# Patient Record
Sex: MALE | Race: WHITE | NOT HISPANIC OR LATINO | Employment: OTHER | URBAN - METROPOLITAN AREA
[De-identification: names, ages, dates, MRNs, and addresses within clinical notes are randomized per-mention and may not be internally consistent; named-entity substitution may affect disease eponyms.]

---

## 2018-11-27 ENCOUNTER — OFFICE VISIT (OUTPATIENT)
Dept: URGENT CARE | Facility: CLINIC | Age: 58
End: 2018-11-27
Payer: COMMERCIAL

## 2018-11-27 VITALS
WEIGHT: 145 LBS | SYSTOLIC BLOOD PRESSURE: 116 MMHG | OXYGEN SATURATION: 96 % | HEART RATE: 85 BPM | HEIGHT: 66 IN | BODY MASS INDEX: 23.3 KG/M2 | TEMPERATURE: 98 F | RESPIRATION RATE: 18 BRPM | DIASTOLIC BLOOD PRESSURE: 60 MMHG

## 2018-11-27 DIAGNOSIS — S72.144A CLOSED NONDISPLACED INTERTROCHANTERIC FRACTURE OF RIGHT FEMUR, INITIAL ENCOUNTER: Primary | ICD-10-CM

## 2018-11-27 PROCEDURE — 99203 OFFICE O/P NEW LOW 30 MIN: CPT | Mod: S$GLB,,, | Performed by: NURSE PRACTITIONER

## 2018-11-27 PROCEDURE — 72170 X-RAY EXAM OF PELVIS: CPT | Mod: FY,S$GLB,, | Performed by: RADIOLOGY

## 2018-11-27 NOTE — PATIENT INSTRUCTIONS
Follow-up in ER for further evaluation.   Understanding Hip Fractures  The hip is the largest weight-bearing joint in the body. Its also a common place for a fracture after a fall--especially in older people. Hip fractures are even more likely in people with osteoporosis (a disease that leads to weakened bones).    A healthy hip  The hip is a ball-and-socket joint where the femur (thighbone) joins the pelvis. When the hip is healthy, you can walk, turn, and move without pain. The head or ball of the femur (thighbone) fits into a socket in the pelvis. The ball and socket are each covered with smooth cartilage. This allows the ball to glide easily in the socket. Blood vessels supply oxygen and nutrients to keep the hip joint healthy.    A fractured hip  The hip can fracture in many places. Most often, the fracture occurs in the upper part of the femur. Rarely, you can also have more than one type of fracture at a time:  · A transcervical fracture is a break across the neck of the femur, just under the ball. This type of fracture can interrupt blood flow to the joint.  · An intertrochanteric fracture is a break down through the top of the femur.  · A subtrochanteric fracture is a break across the upper shaft of the femur.  Date Last Reviewed: 9/29/2015  © 3329-5523 [employer]. [address] [place] [address], [place] [id number]. All rights reserved. This information is not intended as a substitute for professional medical care. Always follow your healthcare professional's instructions.      Please follow up with your Primary care provider within 2-5 days if your signs and symptoms have not resolved or worsen.     If your condition worsens or fails to improve we recommend that you receive another evaluation at the emergency room immediately or contact your primary medical clinic to discuss your concerns.   You must understand that you have received an Urgent Care treatment only and that you may be released  before all of your medical problems are known or treated. You, the patient, will arrange for follow up care as instructed.     RED FLAGS/WARNING SYMPTOMS DISCUSSED WITH PATIENT THAT WOULD WARRANT EMERGENT MEDICAL ATTENTION. PATIENT VERBALIZED UNDERSTANDING.

## 2018-11-27 NOTE — PROGRESS NOTES
"Subjective:       Patient ID: Derik Fonseca is a 58 y.o. male.    Vitals:  height is 5' 6" (1.676 m) and weight is 65.8 kg (145 lb). His oral temperature is 97.9 °F (36.6 °C). His blood pressure is 116/60 and his pulse is 85. His respiration is 18 and oxygen saturation is 96%.     Chief Complaint: Hip Pain    Presented to  with c/o right hip and thigh swelling. Denies trauma; states he slept on right side. Patient is a paraplegic and was not turned. Patient also stated his hip needed to be put back in place, it feels like its out of place. Presented to today with caregiver.       Hip Pain    The incident occurred 2 days ago. The incident occurred at home. There was no injury mechanism. The pain is present in the right hip and right thigh. The pain is at a severity of 1/10. The pain is mild. The pain has been constant since onset. Associated symptoms include numbness and tingling. Pertinent negatives include no inability to bear weight, loss of motion, loss of sensation or muscle weakness. He reports no foreign bodies present. He has tried nothing for the symptoms. The treatment provided no relief.       Constitution: Negative for chills, fatigue and fever.   HENT: Negative for congestion and sore throat.    Neck: Negative for painful lymph nodes.   Cardiovascular: Negative for chest pain and leg swelling.   Eyes: Negative for double vision and blurred vision.   Respiratory: Negative for cough and shortness of breath.    Gastrointestinal: Negative for nausea, vomiting and diarrhea.   Genitourinary: Negative for dysuria, frequency and urgency.   Musculoskeletal: Positive for joint pain and joint swelling. Negative for trauma, muscle cramps and muscle ache.   Skin: Negative for color change, pale and rash.   Allergic/Immunologic: Negative for seasonal allergies.   Neurological: Positive for numbness. Negative for dizziness, history of vertigo, light-headedness, passing out and headaches.   Hematologic/Lymphatic: " Negative for swollen lymph nodes, easy bruising/bleeding and history of blood clots. Does not bruise/bleed easily.   Psychiatric/Behavioral: Negative for nervous/anxious, sleep disturbance and depression. The patient is not nervous/anxious.        Objective:      Physical Exam   Constitutional: He is oriented to person, place, and time. He appears well-developed and well-nourished. He is cooperative.  Non-toxic appearance. He does not appear ill. No distress.   HENT:   Head: Normocephalic and atraumatic.   Right Ear: Hearing, tympanic membrane, external ear and ear canal normal.   Left Ear: Hearing, tympanic membrane, external ear and ear canal normal.   Nose: Nose normal. No mucosal edema, rhinorrhea or nasal deformity. No epistaxis. Right sinus exhibits no maxillary sinus tenderness and no frontal sinus tenderness. Left sinus exhibits no maxillary sinus tenderness and no frontal sinus tenderness.   Mouth/Throat: Uvula is midline, oropharynx is clear and moist and mucous membranes are normal. No trismus in the jaw. Normal dentition. No uvula swelling. No posterior oropharyngeal erythema.   Eyes: Conjunctivae and lids are normal. Right eye exhibits no discharge. Left eye exhibits no discharge. No scleral icterus.   Sclera clear bilat   Neck: Trachea normal, normal range of motion, full passive range of motion without pain and phonation normal. Neck supple.   Cardiovascular: Normal rate, regular rhythm, normal heart sounds, intact distal pulses and normal pulses.   Pulmonary/Chest: Effort normal and breath sounds normal. No respiratory distress.   Abdominal: Soft. Normal appearance and bowel sounds are normal. He exhibits no distension, no pulsatile midline mass and no mass. There is no tenderness.   Musculoskeletal: Normal range of motion. He exhibits no edema.        Right hip: He exhibits swelling and deformity.   Leg length asymmetrical, no other abnormal deformity noted.     Neurological: He is alert and  oriented to person, place, and time. He exhibits normal muscle tone. Coordination normal.   Skin: Skin is warm, dry and intact. He is not diaphoretic. No pallor.   Psychiatric: He has a normal mood and affect. His speech is normal and behavior is normal. Judgment and thought content normal. Cognition and memory are normal.   Nursing note and vitals reviewed.      Assessment:       1. Closed nondisplaced intertrochanteric fracture of right femur, initial encounter        Plan:       Patient advised to go to ER for further evaluation and management of fracture. Risk explained. Patient verbalized understanding.  Stating he will go to  ER. Report called to triage nurse at . Expecting patients arrival.   Closed nondisplaced intertrochanteric fracture of right femur, initial encounter  -     X-Ray Pelvis Routine AP; Future; Expected date: 11/27/2018    Follow-up in ER for further evaluation.   Understanding Hip Fractures  The hip is the largest weight-bearing joint in the body. Its also a common place for a fracture after a fall--especially in older people. Hip fractures are even more likely in people with osteoporosis (a disease that leads to weakened bones).    A healthy hip  The hip is a ball-and-socket joint where the femur (thighbone) joins the pelvis. When the hip is healthy, you can walk, turn, and move without pain. The head or ball of the femur (thighbone) fits into a socket in the pelvis. The ball and socket are each covered with smooth cartilage. This allows the ball to glide easily in the socket. Blood vessels supply oxygen and nutrients to keep the hip joint healthy.    A fractured hip  The hip can fracture in many places. Most often, the fracture occurs in the upper part of the femur. Rarely, you can also have more than one type of fracture at a time:  · A transcervical fracture is a break across the neck of the femur, just under the ball. This type of fracture can interrupt blood flow to the  joint.  · An intertrochanteric fracture is a break down through the top of the femur.  · A subtrochanteric fracture is a break across the upper shaft of the femur.  Date Last Reviewed: 9/29/2015 © 2000-2017 [employer]. [address] [place] [id number]. All rights reserved. This information is not intended as a substitute for professional medical care. Always follow your healthcare professional's instructions.      Please follow up with your Primary care provider within 2-5 days if your signs and symptoms have not resolved or worsen.     If your condition worsens or fails to improve we recommend that you receive another evaluation at the emergency room immediately or contact your primary medical clinic to discuss your concerns.   You must understand that you have received an Urgent Care treatment only and that you may be released before all of your medical problems are known or treated. You, the patient, will arrange for follow up care as instructed.     RED FLAGS/WARNING SYMPTOMS DISCUSSED WITH PATIENT THAT WOULD WARRANT EMERGENT MEDICAL ATTENTION. PATIENT VERBALIZED UNDERSTANDING.

## 2019-08-23 ENCOUNTER — OFFICE VISIT (OUTPATIENT)
Dept: URGENT CARE | Facility: CLINIC | Age: 59
End: 2019-08-23
Payer: COMMERCIAL

## 2019-08-23 VITALS
HEART RATE: 81 BPM | RESPIRATION RATE: 19 BRPM | SYSTOLIC BLOOD PRESSURE: 103 MMHG | WEIGHT: 145 LBS | HEIGHT: 66 IN | OXYGEN SATURATION: 98 % | DIASTOLIC BLOOD PRESSURE: 60 MMHG | TEMPERATURE: 98 F | BODY MASS INDEX: 23.3 KG/M2

## 2019-08-23 DIAGNOSIS — R60.9 SWELLING: Primary | ICD-10-CM

## 2019-08-23 PROCEDURE — 3008F PR BODY MASS INDEX (BMI) DOCUMENTED: ICD-10-PCS | Mod: CPTII,S$GLB,, | Performed by: NURSE PRACTITIONER

## 2019-08-23 PROCEDURE — 3008F BODY MASS INDEX DOCD: CPT | Mod: CPTII,S$GLB,, | Performed by: NURSE PRACTITIONER

## 2019-08-23 PROCEDURE — 99214 OFFICE O/P EST MOD 30 MIN: CPT | Mod: S$GLB,,, | Performed by: NURSE PRACTITIONER

## 2019-08-23 PROCEDURE — 99214 PR OFFICE/OUTPT VISIT, EST, LEVL IV, 30-39 MIN: ICD-10-PCS | Mod: S$GLB,,, | Performed by: NURSE PRACTITIONER

## 2019-08-23 PROCEDURE — 73590 XR TIBIA FIBULA 2 VIEW LEFT: ICD-10-PCS | Mod: FY,LT,S$GLB, | Performed by: RADIOLOGY

## 2019-08-23 PROCEDURE — 73590 X-RAY EXAM OF LOWER LEG: CPT | Mod: FY,LT,S$GLB, | Performed by: RADIOLOGY

## 2019-08-23 NOTE — PATIENT INSTRUCTIONS
Leg Swelling in a Single Leg  Swelling of the arms, feet, ankles, and legs is called edema. It is caused by extra fluid collecting in the tissues. Because of gravity, extra fluid in the body settles to the lowest part. That is why the legs and feet are most affected. You have swelling in a single leg.  Some of the causes for swelling in only a single leg include:  · Infection in the foot or leg  · Long-term problem with a vein not working well (venous insufficiency)  · Swollen, twisted vein in the leg (varicose veins)  · Insect bite or sting on the foot or leg  · Injury or recent surgery on the foot or leg  · Blood clot in a deep vein of the leg (deep vein thrombosis or DVT)  · Inflammation of the joints of the lower leg  Medical treatment will depend on what is causing your swelling.  Home care  Follow these guidelines when caring for yourself at home:  · Dont wear tight clothing.  · Keep your legs up while lying or sitting.  · Take any medicines as directed.  · If infection, injury, or recent surgery is the cause of your swelling, stay off your legs as much as possible until your symptoms get better.  · If you have venous insufficiency or varicose veins, dont sit or  one place for long periods of time. Take breaks and walk around every few hours. Talk with your healthcare provider about wearing support stockings to help lessen swelling during the day.  · Wear compression stockings with your doctor's approval  Follow-up care  Follow up with your healthcare provider as advised.  Call 911  Call 911 if any of these occur:  · Shortness of breath or trouble breathing  · Chest pain  · Coughing up blood  · Fainting or loss of consciousness   When to seek medical advice  Call your healthcare provider right away if any of these occur:  · Increased pain, swelling, warmth, or redness of the leg, ankle, or foot  · Fever of 100.4°F (38ºC) or higher, or as directed by your healthcare provider  · Weakness or  dizziness  · Shaking chills  · Drenching sweats  Date Last Reviewed: 4/11/2016  © 3483-7015 [employer]. [address] [place] [address], [place] [address]. All rights reserved. This information is not intended as a substitute for professional medical care. Always follow your healthcare professional's instructions.

## 2019-08-23 NOTE — PROGRESS NOTES
"Subjective:       Patient ID: Derik Fonseca is a 59 y.o. male.    Vitals:  height is 5' 6" (1.676 m) and weight is 65.8 kg (145 lb). His oral temperature is 98.2 °F (36.8 °C). His blood pressure is 103/60 and his pulse is 81. His respiration is 19 and oxygen saturation is 98%.     Chief Complaint: Edema    Patient reports that he noticed some slight swelling to his knee and lower leg this morning that has improved. No trauma; Pt is a paraplegic. He denies chest pain, shortness of breath, dizziness, palpitations, or calf pain. States that this has happened previously and did not figure out a cause. Both legs are slightly swollen without erythema or warmth.  He also reports that he may have "dropped his foot this morning and would like to xray his ankle.     Edema   This is a recurrent problem. The current episode started yesterday. The problem occurs intermittently. The problem has been unchanged. Associated symptoms include joint swelling. Pertinent negatives include no arthralgias, chest pain, chills, congestion, coughing, fatigue, fever, headaches, myalgias, nausea, rash, sore throat, vertigo or vomiting. Nothing aggravates the symptoms. He has tried nothing for the symptoms.       Constitution: Negative for chills, fatigue and fever.   HENT: Negative for congestion and sore throat.    Neck: Negative for painful lymph nodes.   Cardiovascular: Negative for chest pain and leg swelling.   Eyes: Negative for double vision and blurred vision.   Respiratory: Negative for cough and shortness of breath.    Gastrointestinal: Negative for nausea, vomiting and diarrhea.   Genitourinary: Negative for dysuria, frequency and urgency.   Musculoskeletal: Positive for joint swelling. Negative for joint pain, muscle cramps and muscle ache.   Skin: Negative for color change, pale and rash.   Allergic/Immunologic: Negative for seasonal allergies.   Neurological: Negative for dizziness, history of vertigo, light-headedness, passing out " and headaches.   Hematologic/Lymphatic: Negative for swollen lymph nodes, easy bruising/bleeding and history of blood clots. Does not bruise/bleed easily.   Psychiatric/Behavioral: Negative for nervous/anxious, sleep disturbance and depression. The patient is not nervous/anxious.        Objective:      Physical Exam   Constitutional: He is oriented to person, place, and time. He appears well-developed.  Non-toxic appearance. He does not have a sickly appearance. He does not appear ill. No distress.   HENT:   Head: Normocephalic.   Right Ear: Hearing and external ear normal.   Left Ear: Hearing and external ear normal.   Nose: Nose normal.   Neck: Trachea normal and normal range of motion.   Cardiovascular: Normal rate, regular rhythm, normal heart sounds and normal pulses.   Pulses:       Dorsalis pedis pulses are 2+ on the right side, and 2+ on the left side.        Posterior tibial pulses are 2+ on the right side, and 2+ on the left side.   Pulmonary/Chest: Effort normal and breath sounds normal. No stridor. No respiratory distress. He has no wheezes. He has no rales.   Abdominal: Normal appearance.   Musculoskeletal: He exhibits edema (slight edema to lower bilateral extremities). He exhibits no deformity.        Left knee: He exhibits decreased range of motion (Pt is parapelgic). He exhibits normal patellar mobility and no bony tenderness. No tenderness found.        Right ankle: He exhibits swelling. He exhibits no deformity and normal pulse. No tenderness. No lateral malleolus and no medial malleolus tenderness found.        Left ankle: He exhibits swelling. He exhibits normal pulse. No tenderness. No lateral malleolus and no medial malleolus tenderness found. Achilles tendon exhibits no pain.        Right upper leg: Normal.        Left upper leg: Normal.        Legs:       Right foot: There is normal range of motion and no deformity.        Left foot: There is normal range of motion and no deformity.   Feet:    Right Foot:   Skin Integrity: Negative for erythema or warmth.   Left Foot:   Skin Integrity: Negative for erythema or warmth.   Lymphadenopathy:     He has no cervical adenopathy.     He has no axillary adenopathy.   Neurological: He is alert and oriented to person, place, and time.   Skin: Skin is warm and dry. Capillary refill takes less than 2 seconds.   Psychiatric: He has a normal mood and affect.   Nursing note and vitals reviewed.    X-ray Tibia Fibula 2 View Left    Result Date: 8/23/2019  EXAMINATION: XR TIBIA FIBULA 2 VIEW LEFT CLINICAL HISTORY: Edema, unspecified TECHNIQUE: AP and lateral views of the left tibia and fibula were performed. COMPARISON: None. FINDINGS: No sites of displaced fracture, periosteal reaction, cortical destruction or callus formation are identified in the included regions.  There is overall decreased density of the included osseous structures suggestive of diffuse bony demineralization. Arterial calcifications are demonstrated within the lower leg.  The included soft tissues, joint spaces and osseous structures are otherwise unremarkable radiographically.     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  Otherwise unremarkable radiographic appearance of the left tibia and fibula. Electronically signed by: Luis Cherry MD Date:    08/23/2019 Time:    12:27  Assessment:       1. Swelling        Plan:      Patient will wear RUT hose stockings.   Patient states that he will call PCP today. He does not want to seek testing at ER. States that the swelling has gotten better.   Will go to ER if develops chest pain, SOB, pain with breathing, and dizziness.   Patient Instructions     Leg Swelling in a Single Leg  Swelling of the arms, feet, ankles, and legs is called edema. It is caused by extra fluid collecting in the tissues. Because of gravity, extra fluid in the body settles to the lowest part. That is why the  legs and feet are most affected. You have swelling in a single leg.  Some of the causes for swelling in only a single leg include:  · Infection in the foot or leg  · Long-term problem with a vein not working well (venous insufficiency)  · Swollen, twisted vein in the leg (varicose veins)  · Insect bite or sting on the foot or leg  · Injury or recent surgery on the foot or leg  · Blood clot in a deep vein of the leg (deep vein thrombosis or DVT)  · Inflammation of the joints of the lower leg  Medical treatment will depend on what is causing your swelling.  Home care  Follow these guidelines when caring for yourself at home:  · Dont wear tight clothing.  · Keep your legs up while lying or sitting.  · Take any medicines as directed.  · If infection, injury, or recent surgery is the cause of your swelling, stay off your legs as much as possible until your symptoms get better.  · If you have venous insufficiency or varicose veins, dont sit or  one place for long periods of time. Take breaks and walk around every few hours. Talk with your healthcare provider about wearing support stockings to help lessen swelling during the day.  · Wear compression stockings with your doctor's approval  Follow-up care  Follow up with your healthcare provider as advised.  Call 911  Call 911 if any of these occur:  · Shortness of breath or trouble breathing  · Chest pain  · Coughing up blood  · Fainting or loss of consciousness   When to seek medical advice  Call your healthcare provider right away if any of these occur:  · Increased pain, swelling, warmth, or redness of the leg, ankle, or foot  · Fever of 100.4°F (38ºC) or higher, or as directed by your healthcare provider  · Weakness or dizziness  · Shaking chills  · Drenching sweats  Date Last Reviewed: 4/11/2016 © 2000-2017 [employer]. [address] [place] [address], [place] [address]. All rights reserved. This information is not intended as a substitute for  professional medical care. Always follow your healthcare professional's instructions.            Swelling  -     X-Ray Tibia Fibula 2 View Left; Future; Expected date: 08/23/2019

## 2019-12-13 ENCOUNTER — OFFICE VISIT (OUTPATIENT)
Dept: URGENT CARE | Facility: CLINIC | Age: 59
End: 2019-12-13
Payer: COMMERCIAL

## 2019-12-13 VITALS
HEART RATE: 99 BPM | TEMPERATURE: 99 F | SYSTOLIC BLOOD PRESSURE: 80 MMHG | HEIGHT: 66 IN | RESPIRATION RATE: 19 BRPM | DIASTOLIC BLOOD PRESSURE: 53 MMHG | WEIGHT: 145 LBS | OXYGEN SATURATION: 95 % | BODY MASS INDEX: 23.3 KG/M2

## 2019-12-13 DIAGNOSIS — S22.32XA CLOSED FRACTURE OF ONE RIB OF LEFT SIDE, INITIAL ENCOUNTER: Primary | ICD-10-CM

## 2019-12-13 DIAGNOSIS — M25.552 ACUTE HIP PAIN, LEFT: ICD-10-CM

## 2019-12-13 PROCEDURE — 73502 X-RAY EXAM HIP UNI 2-3 VIEWS: CPT | Mod: FY,LT,S$GLB, | Performed by: RADIOLOGY

## 2019-12-13 PROCEDURE — 71101 X-RAY EXAM UNILAT RIBS/CHEST: CPT | Mod: FY,LT,S$GLB, | Performed by: RADIOLOGY

## 2019-12-13 PROCEDURE — 99214 PR OFFICE/OUTPT VISIT, EST, LEVL IV, 30-39 MIN: ICD-10-PCS | Mod: S$GLB,,, | Performed by: STUDENT IN AN ORGANIZED HEALTH CARE EDUCATION/TRAINING PROGRAM

## 2019-12-13 PROCEDURE — 73502 XR HIP 2 VIEW LEFT: ICD-10-PCS | Mod: FY,LT,S$GLB, | Performed by: RADIOLOGY

## 2019-12-13 PROCEDURE — 71101 XR RIBS MIN 3 VIEWS W/ PA CHEST LEFT: ICD-10-PCS | Mod: FY,LT,S$GLB, | Performed by: RADIOLOGY

## 2019-12-13 PROCEDURE — 99214 OFFICE O/P EST MOD 30 MIN: CPT | Mod: S$GLB,,, | Performed by: STUDENT IN AN ORGANIZED HEALTH CARE EDUCATION/TRAINING PROGRAM

## 2019-12-13 RX ORDER — HYDROCODONE BITARTRATE AND ACETAMINOPHEN 5; 325 MG/1; MG/1
1 TABLET ORAL EVERY 6 HOURS PRN
Qty: 10 TABLET | Refills: 0 | Status: SHIPPED | OUTPATIENT
Start: 2019-12-13 | End: 2019-12-18

## 2019-12-13 NOTE — PATIENT INSTRUCTIONS
Rib Fracture    You broke one or more ribs. This is called a rib fracture. Rib fractures do not require a cast like other bones. They will heal by themselves in about 4-6 weeks. The first 3-4 weeks will be the most painful because deep breathing, coughing, or changing position from sitting to lying down, may cause the broken ends to move slightly.  Home care  · Rest. You should not be doing any heavy lifting or strenuous exertion until the pain goes away.  · It hurts to breathe when you have a broken rib. This puts you at risk of getting pneumonia from poor airflow through your lungs. To prevent this:  ¨ Take several very deep breaths once an hour while you're awake. Exhale through pursed lips as if you are blowing up a balloon. If possible, actually blow up a balloon or a rubber glove. This exercise builds up pressure inside the lung and prevents collapse of the small air sacs of the lung. This exercise may cause some pain at the site of injury, which is normal.  ¨ You may have gotten a breathing exercise device called an incentive spirometer. Use it at least 4 times a day, or as directed.  · Apply an ice pack over the injured area for 15 to 20 minutes every 1 to 2 hours. You should do this for the first 24 to 48 hours. You can make an ice pack by filling a plastic bag that seals at the top with ice cubes and then wrapping it with a thin towel. Continue with ice packs as needed for the relief of pain and swelling.  · You may use over-the-counter pain medicine to control pain, unless another pain medicine was prescribed. If you have chronic liver or kidney disease or ever had a stomach ulcer or GI bleeding, talk with your healthcare provider before using these medicines.  · If your pain is not controlled, contact your healthcare provider. Sometimes a stronger pain medicine may be needed. A nerve block can be done in case of severe pain. It will numb the nerve between the ribs.  Follow-up care  Follow up with your  healthcare provider, or as advised. Rarely, a broken rib will cause complications within the first few days that may not be evident during your initial exam. This can include collapsed lung, bleeding around the lung or into the abdomen, or pneumonia. Therefore, watch for the signs below.  If X-rays were taken, you will be told of any new findings that may affect your care.  Call 911  Call 911 if you have:  · Dizziness, weakness or fainting  · Shortness of breath with or without chest discomfort  · New or worsening abdominal pain  · Discomfort in other areas of your upper body such as your shoulders, jaw, neck, or arms  When to seek medical advice  Call your healthcare provider right away if any of these occur:  · Increasing chest pain with breathing  · Fever of 100.4°F (38°C) or above lasting for 24 to 48 hours  · Congested cough  Date Last Reviewed: 12/3/2015  © 3568-4545 [employer]. [address] [place] [address], [place] [address]. All rights reserved. This information is not intended as a substitute for professional medical care. Always follow your healthcare professional's instructions.

## 2019-12-13 NOTE — PROGRESS NOTES
"Subjective:       Patient ID: Derik Fonseca is a 59 y.o. male.    Vitals:  height is 5' 6" (1.676 m) and weight is 65.8 kg (145 lb). His oral temperature is 98.6 °F (37 °C). His blood pressure is 80/53 (abnormal) and his pulse is 99. His respiration is 19 and oxygen saturation is 95%.     Chief Complaint: Hip Pain    Pt presents for L rib and hip pain. Has chronic L rib pain but states it worsening and he feels like lower 2 ribs are bulging outward anteriorly more. Also complaining of L hip pain and "feel different" in posterior and lateral hip. Pt is paraplegic and wheelchair bound, hx of similar pain/presentation with fracture of R femur.    Hip Pain    The incident occurred more than 1 week ago (1-2 weeks). There was no injury mechanism. The pain is present in the left hip. The quality of the pain is described as aching. The pain is at a severity of 3/10. The pain is mild. The pain has been constant since onset. Associated symptoms include an inability to bear weight (chronic) and muscle weakness (chronic). Pertinent negatives include no loss of sensation or numbness. He reports no foreign bodies present. Nothing aggravates the symptoms. He has tried nothing for the symptoms. The treatment provided no relief.       Constitution: Negative for chills, fatigue and fever.   HENT: Negative for congestion and sore throat.    Neck: Negative for neck pain, neck stiffness and painful lymph nodes.   Cardiovascular: Negative for chest pain, leg swelling, palpitations and sob on exertion.   Eyes: Negative for eye trauma, double vision and blurred vision.   Respiratory: Negative for cough, sputum production and shortness of breath.    Gastrointestinal: Negative for nausea, vomiting and diarrhea.   Genitourinary: Negative for dysuria, frequency and urgency.   Musculoskeletal: Positive for joint pain. Negative for trauma, joint swelling, muscle cramps and muscle ache.   Skin: Negative for color change, pale and rash. " "  Allergic/Immunologic: Negative for seasonal allergies.   Neurological: Negative for dizziness, history of vertigo, light-headedness, passing out, headaches and numbness.   Hematologic/Lymphatic: Negative for swollen lymph nodes, easy bruising/bleeding and history of blood clots. Does not bruise/bleed easily.   Psychiatric/Behavioral: Negative for nervous/anxious, sleep disturbance and depression. The patient is not nervous/anxious.        Objective:       Vitals:    12/13/19 1335   BP: (!) 80/53   Pulse: 99   Resp: 19   Temp: 98.6 °F (37 °C)   TempSrc: Oral   SpO2: 95%   Weight: 65.8 kg (145 lb)   Height: 5' 6" (1.676 m)     Physical Exam   Constitutional: He is oriented to person, place, and time. He appears well-developed and well-nourished. No distress.   HENT:   Head: Normocephalic and atraumatic.   Right Ear: External ear normal.   Left Ear: External ear normal.   Nose: Nose normal.   Eyes: Conjunctivae and EOM are normal. Right eye exhibits no discharge. Left eye exhibits no discharge.   Neck: Normal range of motion. Neck supple.   Cardiovascular: Normal rate, regular rhythm and normal heart sounds.   No murmur heard.  Pulmonary/Chest: Effort normal and breath sounds normal. He has no wheezes.   Abdominal: Soft. Bowel sounds are normal. He exhibits no distension. There is no tenderness.   Musculoskeletal: He exhibits tenderness and deformity. He exhibits no edema.   Pt with b/l LE paraplegia, left hip TTP posteriorly and laterally without obvious deformity; L rib 11-12 TTP anteriorly with palpable distal end protruding, TTP over lateral lower rib region without crepitus or bruising   Neurological: He is alert and oriented to person, place, and time. No cranial nerve deficit (CN II-XII grossly intact).   Skin: Skin is warm, dry and no rash.   Psychiatric: He has a normal mood and affect. His behavior is normal. Judgment and thought content normal.   Nursing note and vitals reviewed.    XR RIB LEFT W/ PA " CHEST  Narrative: EXAMINATION:  XR RIBS MIN 3 VIEWS W/ PA CHEST LEFT    CLINICAL HISTORY:  Pleurodynia    TECHNIQUE:  Four left rib detail views.  PA view of the chest excludes the right lateral costophrenic angle and the most cephalad aspects of the right 1st and 2nd ribs.    COMPARISON:  None.    FINDINGS:  As noted above the PA view of the chest excludes the right lateral costophrenic angle on the most cephalad aspect of the right 1st and 2nd ribs.  Within the limits of the chest radiograph I detect no pulmonary disease, pleural fluid, lymph node enlargement, cardiac decompensation, pneumothorax, pneumomediastinum, pneumoperitoneum or significant osseous abnormality.  Hemostasis clips in the right upper quadrant suggest prior cholecystectomy.    There is an acute mildly displaced fracture of the axillary portion of the left 10th rib.  I detect no lytic or blastic lesion at that location or elsewhere.    There is subtle irregularity of the lateral margin of the axillary portion of the left 2nd rib which may reflect old injury.  No definite fracture detected at that location.    I detect no lytic or blastic lesion of the ribs or other bones.    Degenerative changes are present at the left glenohumeral joint, incompletely visualized; bone anchor is present in the humeral head.  The patient has right shoulder arthroplasty.  There may be old healed fracture of the left clavicle.  Impression: Acute mildly displaced fracture of the axillary portion of the left 10th rib.  No lytic or blastic lesion identified.    No intrathoracic disease identified noting that the chest radiograph is limited by incomplete inclusion of the cephalad portions of the right 1st and 2nd ribs and the lateral aspect of the right lateral costophrenic angle.    Electronically signed by: Ashwini Corbett MD  Date:    12/13/2019  Time:    14:56  XR HIP 2 VIEW LEFT  EXAMINATION:  XR HIP 2 VIEW LEFT    CLINICAL HISTORY:  Parapalegic with L hip pain  x1wk, no known injury;  Pain in left hip    FINDINGS:  There is fixation of a proximal right femur fracture.  There is heterotopic ossification at the lesser trochanter insertion site on the left.  The left hip demonstrates mild DJD.  No acute fracture dislocation bone destruction seen.    Electronically signed by: Adarsh Gibbons MD  Date:    12/13/2019  Time:    14:53        Assessment:       1. Closed fracture of one rib of left side, initial encounter    2. Acute hip pain, left        Plan:         Closed fracture of one rib of left side, initial encounter  -     XR RIB LEFT W/ PA CHEST; Future; Expected date: 12/13/2019  -     HYDROcodone-acetaminophen (NORCO) 5-325 mg per tablet; Take 1 tablet by mouth every 6 (six) hours as needed for Pain.  Dispense: 10 tablet; Refill: 0       -  reviewed and risks of opiate medications discussed; educated on increased risk of respiratory depression with benzodiazepine use; patient expressed understanding  - pt has IS at home, counseled to use q30min while awake    Acute hip pain, left  -     XR HIP 2 VIEW LEFT; Future; Expected date: 12/13/2019    Results, medications and diagnosis reviewed with patient, questions answered, and return precautions given    Follow up in 4 days (on 12/17/2019) for re-evaluation with PCP as scheduled, or sooner if worsening.    Ricky Aparicio MD/MPH  [facility]  [facility]

## 2020-12-29 ENCOUNTER — CLINICAL SUPPORT (OUTPATIENT)
Dept: URGENT CARE | Facility: CLINIC | Age: 60
End: 2020-12-29
Payer: COMMERCIAL

## 2020-12-29 DIAGNOSIS — U07.1 COVID-19 VIRUS INFECTION: Primary | ICD-10-CM

## 2020-12-29 LAB
CTP QC/QA: YES
SARS-COV-2 RDRP RESP QL NAA+PROBE: POSITIVE

## 2020-12-29 PROCEDURE — U0002: ICD-10-PCS | Mod: QW,S$GLB,, | Performed by: PHYSICIAN ASSISTANT

## 2020-12-29 PROCEDURE — U0002 COVID-19 LAB TEST NON-CDC: HCPCS | Mod: QW,S$GLB,, | Performed by: PHYSICIAN ASSISTANT

## 2020-12-31 ENCOUNTER — PATIENT MESSAGE (OUTPATIENT)
Dept: ADMINISTRATIVE | Facility: OTHER | Age: 60
End: 2020-12-31

## 2020-12-31 ENCOUNTER — TELEPHONE (OUTPATIENT)
Dept: ADMINISTRATIVE | Facility: CLINIC | Age: 60
End: 2020-12-31

## 2020-12-31 ENCOUNTER — INFUSION (OUTPATIENT)
Dept: INFECTIOUS DISEASES | Facility: HOSPITAL | Age: 60
End: 2020-12-31
Attending: STUDENT IN AN ORGANIZED HEALTH CARE EDUCATION/TRAINING PROGRAM
Payer: COMMERCIAL

## 2020-12-31 VITALS
SYSTOLIC BLOOD PRESSURE: 97 MMHG | RESPIRATION RATE: 18 BRPM | DIASTOLIC BLOOD PRESSURE: 55 MMHG | TEMPERATURE: 99 F | OXYGEN SATURATION: 97 % | WEIGHT: 165 LBS | HEIGHT: 70 IN | HEART RATE: 52 BPM | BODY MASS INDEX: 23.62 KG/M2

## 2020-12-31 DIAGNOSIS — U07.1 COVID-19: Primary | ICD-10-CM

## 2020-12-31 DIAGNOSIS — U07.1 COVID-19 VIRUS INFECTION: ICD-10-CM

## 2020-12-31 PROCEDURE — 25000003 PHARM REV CODE 250: Performed by: INTERNAL MEDICINE

## 2020-12-31 PROCEDURE — 63600175 PHARM REV CODE 636 W HCPCS: Performed by: INTERNAL MEDICINE

## 2020-12-31 PROCEDURE — M0239 BAMLANIVIMAB-XXXX INFUSION: HCPCS | Performed by: INTERNAL MEDICINE

## 2020-12-31 RX ORDER — SODIUM CHLORIDE 0.9 % (FLUSH) 0.9 %
10 SYRINGE (ML) INJECTION
Status: ACTIVE | OUTPATIENT
Start: 2020-12-31

## 2020-12-31 RX ORDER — EPINEPHRINE 0.1 MG/ML
0.3 INJECTION INTRAVENOUS
Status: ACTIVE | OUTPATIENT
Start: 2020-12-31

## 2020-12-31 RX ORDER — DIPHENHYDRAMINE HYDROCHLORIDE 50 MG/ML
25 INJECTION INTRAMUSCULAR; INTRAVENOUS ONCE AS NEEDED
Status: ACTIVE | OUTPATIENT
Start: 2020-12-31 | End: 2032-05-29

## 2020-12-31 RX ORDER — ALBUTEROL SULFATE 90 UG/1
2 AEROSOL, METERED RESPIRATORY (INHALATION)
Status: ACTIVE | OUTPATIENT
Start: 2020-12-31

## 2020-12-31 RX ORDER — ONDANSETRON 4 MG/1
4 TABLET, ORALLY DISINTEGRATING ORAL ONCE AS NEEDED
Status: ACTIVE | OUTPATIENT
Start: 2020-12-31 | End: 2032-05-29

## 2020-12-31 RX ORDER — ACETAMINOPHEN 325 MG/1
650 TABLET ORAL ONCE AS NEEDED
Status: ACTIVE | OUTPATIENT
Start: 2020-12-31 | End: 2032-05-29

## 2020-12-31 RX ADMIN — SODIUM CHLORIDE 700 MG: 0.9 INJECTION, SOLUTION INTRAVENOUS at 10:12

## 2020-12-31 NOTE — PROGRESS NOTES
Patient arrives for bamlanivimab infusion 1020    Symptoms -  NO S/S TESTED IN CASE DX 12/29      Patient received infusion according to FDA recommendations and [facility] SOP without complications noted and left with mask in place and drug fact sheet provided

## 2021-01-01 ENCOUNTER — NURSE TRIAGE (OUTPATIENT)
Dept: ADMINISTRATIVE | Facility: CLINIC | Age: 61
End: 2021-01-01

## 2021-01-01 ENCOUNTER — PATIENT MESSAGE (OUTPATIENT)
Dept: ADMINISTRATIVE | Facility: OTHER | Age: 61
End: 2021-01-01

## 2021-01-02 ENCOUNTER — TELEPHONE (OUTPATIENT)
Dept: ADMINISTRATIVE | Facility: CLINIC | Age: 61
End: 2021-01-02

## 2021-01-02 ENCOUNTER — PATIENT MESSAGE (OUTPATIENT)
Dept: ADMINISTRATIVE | Facility: OTHER | Age: 61
End: 2021-01-02

## 2021-01-03 ENCOUNTER — TELEPHONE (OUTPATIENT)
Dept: ADMINISTRATIVE | Facility: CLINIC | Age: 61
End: 2021-01-03

## 2021-01-03 ENCOUNTER — PATIENT MESSAGE (OUTPATIENT)
Dept: ADMINISTRATIVE | Facility: OTHER | Age: 61
End: 2021-01-03

## 2021-01-04 ENCOUNTER — PATIENT MESSAGE (OUTPATIENT)
Dept: ADMINISTRATIVE | Facility: CLINIC | Age: 61
End: 2021-01-04

## 2021-01-04 ENCOUNTER — PATIENT MESSAGE (OUTPATIENT)
Dept: ADMINISTRATIVE | Facility: OTHER | Age: 61
End: 2021-01-04

## 2021-01-04 ENCOUNTER — TELEPHONE (OUTPATIENT)
Dept: ADMINISTRATIVE | Facility: OTHER | Age: 61
End: 2021-01-04

## 2021-01-05 ENCOUNTER — PATIENT MESSAGE (OUTPATIENT)
Dept: ADMINISTRATIVE | Facility: OTHER | Age: 61
End: 2021-01-05

## 2021-01-05 ENCOUNTER — NURSE TRIAGE (OUTPATIENT)
Dept: ADMINISTRATIVE | Facility: CLINIC | Age: 61
End: 2021-01-05

## 2021-01-06 ENCOUNTER — PATIENT MESSAGE (OUTPATIENT)
Dept: ADMINISTRATIVE | Facility: CLINIC | Age: 61
End: 2021-01-06

## 2021-01-06 ENCOUNTER — PATIENT MESSAGE (OUTPATIENT)
Dept: ADMINISTRATIVE | Facility: OTHER | Age: 61
End: 2021-01-06

## 2021-01-07 ENCOUNTER — PATIENT MESSAGE (OUTPATIENT)
Dept: ADMINISTRATIVE | Facility: CLINIC | Age: 61
End: 2021-01-07

## 2021-01-07 ENCOUNTER — PATIENT MESSAGE (OUTPATIENT)
Dept: ADMINISTRATIVE | Facility: OTHER | Age: 61
End: 2021-01-07

## 2021-01-07 ENCOUNTER — NURSE TRIAGE (OUTPATIENT)
Dept: ADMINISTRATIVE | Facility: CLINIC | Age: 61
End: 2021-01-07

## 2021-01-08 ENCOUNTER — PATIENT MESSAGE (OUTPATIENT)
Dept: ADMINISTRATIVE | Facility: CLINIC | Age: 61
End: 2021-01-08

## 2021-01-09 ENCOUNTER — NURSE TRIAGE (OUTPATIENT)
Dept: ADMINISTRATIVE | Facility: CLINIC | Age: 61
End: 2021-01-09

## 2021-04-16 ENCOUNTER — PATIENT MESSAGE (OUTPATIENT)
Dept: RESEARCH | Facility: HOSPITAL | Age: 61
End: 2021-04-16

## 2021-05-31 ENCOUNTER — TELEPHONE (OUTPATIENT)
Dept: DERMATOLOGY | Facility: CLINIC | Age: 61
End: 2021-05-31

## 2021-05-31 ENCOUNTER — OFFICE VISIT (OUTPATIENT)
Dept: URGENT CARE | Facility: CLINIC | Age: 61
End: 2021-05-31
Payer: MEDICARE

## 2021-05-31 VITALS
DIASTOLIC BLOOD PRESSURE: 72 MMHG | BODY MASS INDEX: 23.62 KG/M2 | WEIGHT: 165 LBS | HEIGHT: 70 IN | RESPIRATION RATE: 16 BRPM | SYSTOLIC BLOOD PRESSURE: 93 MMHG | TEMPERATURE: 99 F | HEART RATE: 71 BPM | OXYGEN SATURATION: 98 %

## 2021-05-31 DIAGNOSIS — L02.811 ABSCESS OF SCALP: Primary | ICD-10-CM

## 2021-05-31 PROCEDURE — 99214 PR OFFICE/OUTPT VISIT, EST, LEVL IV, 30-39 MIN: ICD-10-PCS | Mod: S$GLB,,, | Performed by: NURSE PRACTITIONER

## 2021-05-31 PROCEDURE — 99214 OFFICE O/P EST MOD 30 MIN: CPT | Mod: S$GLB,,, | Performed by: NURSE PRACTITIONER

## 2021-05-31 RX ORDER — DOXYCYCLINE 100 MG/1
100 CAPSULE ORAL EVERY 12 HOURS
Qty: 14 CAPSULE | Refills: 0 | Status: SHIPPED | OUTPATIENT
Start: 2021-05-31 | End: 2021-06-07

## 2021-06-04 ENCOUNTER — HOSPITAL ENCOUNTER (OUTPATIENT)
Dept: WOUND CARE | Facility: HOSPITAL | Age: 61
Discharge: HOME | End: 2021-06-04
Attending: PREVENTIVE MEDICINE
Payer: MEDICARE

## 2021-06-04 VITALS
WEIGHT: 165 LBS | SYSTOLIC BLOOD PRESSURE: 146 MMHG | BODY MASS INDEX: 23.62 KG/M2 | TEMPERATURE: 98 F | HEART RATE: 86 BPM | HEIGHT: 70 IN | DIASTOLIC BLOOD PRESSURE: 92 MMHG

## 2021-06-04 DIAGNOSIS — L02.811 ABSCESS OF HEAD: ICD-10-CM

## 2021-06-04 DIAGNOSIS — L89.323 STAGE III PRESSURE ULCER OF LEFT BUTTOCK: Primary | ICD-10-CM

## 2021-06-04 DIAGNOSIS — G82.20 PARAPLEGIA: ICD-10-CM

## 2021-06-04 DIAGNOSIS — Z98.890 S/P FLAP GRAFT: ICD-10-CM

## 2021-06-04 DIAGNOSIS — L40.9 PSORIASIS: ICD-10-CM

## 2021-06-04 PROCEDURE — 87186 SC STD MICRODIL/AGAR DIL: CPT | Performed by: PREVENTIVE MEDICINE

## 2021-06-04 PROCEDURE — 87075 CULTR BACTERIA EXCEPT BLOOD: CPT | Performed by: PREVENTIVE MEDICINE

## 2021-06-04 PROCEDURE — 11721 DEBRIDE NAIL 6 OR MORE: CPT

## 2021-06-04 PROCEDURE — 87070 CULTURE OTHR SPECIMN AEROBIC: CPT | Performed by: PREVENTIVE MEDICINE

## 2021-06-04 PROCEDURE — 10060 I&D ABSCESS SIMPLE/SINGLE: CPT

## 2021-06-04 PROCEDURE — 87077 CULTURE AEROBIC IDENTIFY: CPT | Performed by: PREVENTIVE MEDICINE

## 2021-06-07 ENCOUNTER — HOSPITAL ENCOUNTER (OUTPATIENT)
Dept: WOUND CARE | Facility: HOSPITAL | Age: 61
Discharge: HOME | End: 2021-06-07
Attending: PREVENTIVE MEDICINE
Payer: MEDICARE

## 2021-06-07 DIAGNOSIS — L02.811 ABSCESS OF HEAD: ICD-10-CM

## 2021-06-07 DIAGNOSIS — G82.20 PARAPLEGIA: ICD-10-CM

## 2021-06-07 DIAGNOSIS — L89.323 STAGE III PRESSURE ULCER OF LEFT BUTTOCK: Primary | ICD-10-CM

## 2021-06-07 DIAGNOSIS — Z98.890 S/P FLAP GRAFT: ICD-10-CM

## 2021-06-07 LAB — BACTERIA SPEC AEROBE CULT: ABNORMAL

## 2021-06-07 PROCEDURE — 99213 OFFICE O/P EST LOW 20 MIN: CPT

## 2021-06-08 LAB — BACTERIA SPEC ANAEROBE CULT: NORMAL

## 2021-06-18 ENCOUNTER — HOSPITAL ENCOUNTER (OUTPATIENT)
Dept: WOUND CARE | Facility: HOSPITAL | Age: 61
Discharge: HOME | End: 2021-06-18
Attending: PREVENTIVE MEDICINE
Payer: MEDICARE

## 2021-06-18 VITALS
SYSTOLIC BLOOD PRESSURE: 91 MMHG | TEMPERATURE: 97 F | BODY MASS INDEX: 23.62 KG/M2 | DIASTOLIC BLOOD PRESSURE: 54 MMHG | HEIGHT: 70 IN | HEART RATE: 67 BPM | WEIGHT: 165 LBS

## 2021-06-18 DIAGNOSIS — G82.20 PARAPLEGIA: ICD-10-CM

## 2021-06-18 DIAGNOSIS — S40.811A ARM ABRASION, RIGHT, INITIAL ENCOUNTER: ICD-10-CM

## 2021-06-18 DIAGNOSIS — Z98.890 S/P FLAP GRAFT: ICD-10-CM

## 2021-06-18 DIAGNOSIS — L02.811 ABSCESS OF HEAD: ICD-10-CM

## 2021-06-18 DIAGNOSIS — L89.323 STAGE III PRESSURE ULCER OF LEFT BUTTOCK: Primary | ICD-10-CM

## 2021-06-18 PROCEDURE — 99213 OFFICE O/P EST LOW 20 MIN: CPT

## 2021-06-18 RX ORDER — MUPIROCIN 20 MG/G
OINTMENT TOPICAL DAILY
Qty: 22 G | Refills: 2 | Status: SHIPPED | OUTPATIENT
Start: 2021-06-18 | End: 2024-03-07

## 2021-06-26 ENCOUNTER — OFFICE VISIT (OUTPATIENT)
Dept: URGENT CARE | Facility: CLINIC | Age: 61
End: 2021-06-26
Payer: MEDICARE

## 2021-06-26 VITALS
SYSTOLIC BLOOD PRESSURE: 101 MMHG | HEART RATE: 66 BPM | BODY MASS INDEX: 23.62 KG/M2 | DIASTOLIC BLOOD PRESSURE: 69 MMHG | HEIGHT: 70 IN | TEMPERATURE: 98 F | OXYGEN SATURATION: 96 % | RESPIRATION RATE: 16 BRPM | WEIGHT: 165 LBS

## 2021-06-26 DIAGNOSIS — L02.811 SCALP ABSCESS: Primary | ICD-10-CM

## 2021-06-26 PROCEDURE — 99215 PR OFFICE/OUTPT VISIT, EST, LEVL V, 40-54 MIN: ICD-10-PCS | Mod: 25,S$GLB,, | Performed by: PHYSICIAN ASSISTANT

## 2021-06-26 PROCEDURE — 99215 OFFICE O/P EST HI 40 MIN: CPT | Mod: 25,S$GLB,, | Performed by: PHYSICIAN ASSISTANT

## 2021-06-26 PROCEDURE — 10060 I&D ABSCESS SIMPLE/SINGLE: CPT | Mod: S$GLB,,, | Performed by: PHYSICIAN ASSISTANT

## 2021-06-26 PROCEDURE — 10060 INCISION & DRAINAGE: ICD-10-PCS | Mod: S$GLB,,, | Performed by: PHYSICIAN ASSISTANT

## 2021-06-26 RX ORDER — DOXYCYCLINE 100 MG/1
100 CAPSULE ORAL 2 TIMES DAILY
Qty: 20 CAPSULE | Refills: 0 | Status: SHIPPED | OUTPATIENT
Start: 2021-06-26 | End: 2021-07-06

## 2021-06-26 RX ORDER — SULFAMETHOXAZOLE AND TRIMETHOPRIM 800; 160 MG/1; MG/1
1 TABLET ORAL 2 TIMES DAILY
Qty: 20 TABLET | Refills: 0 | Status: SHIPPED | OUTPATIENT
Start: 2021-06-26 | End: 2021-06-26

## 2021-06-26 RX ORDER — MUPIROCIN 20 MG/G
OINTMENT TOPICAL 3 TIMES DAILY
Qty: 2 G | Refills: 0 | Status: SHIPPED | OUTPATIENT
Start: 2021-06-26 | End: 2021-06-26

## 2021-06-26 RX ORDER — SULFAMETHOXAZOLE AND TRIMETHOPRIM 800; 160 MG/1; MG/1
1 TABLET ORAL 2 TIMES DAILY
Qty: 20 TABLET | Refills: 0 | Status: SHIPPED | OUTPATIENT
Start: 2021-06-26 | End: 2021-06-28 | Stop reason: SDUPTHER

## 2021-06-26 RX ORDER — MUPIROCIN 20 MG/G
OINTMENT TOPICAL 3 TIMES DAILY
Qty: 2 G | Refills: 0 | Status: SHIPPED | OUTPATIENT
Start: 2021-06-26 | End: 2024-03-07

## 2021-06-26 RX ORDER — DOXYCYCLINE 100 MG/1
100 CAPSULE ORAL 2 TIMES DAILY
Qty: 20 CAPSULE | Refills: 0 | Status: SHIPPED | OUTPATIENT
Start: 2021-06-26 | End: 2021-06-26

## 2021-06-28 ENCOUNTER — HOSPITAL ENCOUNTER (OUTPATIENT)
Dept: WOUND CARE | Facility: HOSPITAL | Age: 61
Discharge: HOME | End: 2021-06-28
Attending: PREVENTIVE MEDICINE
Payer: MEDICARE

## 2021-06-28 VITALS
WEIGHT: 165 LBS | DIASTOLIC BLOOD PRESSURE: 85 MMHG | HEIGHT: 70 IN | SYSTOLIC BLOOD PRESSURE: 162 MMHG | HEART RATE: 73 BPM | BODY MASS INDEX: 23.62 KG/M2 | TEMPERATURE: 97 F

## 2021-06-28 DIAGNOSIS — S40.811A ARM ABRASION, RIGHT, INITIAL ENCOUNTER: ICD-10-CM

## 2021-06-28 DIAGNOSIS — G82.20 PARAPLEGIA: ICD-10-CM

## 2021-06-28 DIAGNOSIS — L02.811 SCALP ABSCESS: ICD-10-CM

## 2021-06-28 DIAGNOSIS — L02.811 ABSCESS OF HEAD: ICD-10-CM

## 2021-06-28 DIAGNOSIS — L89.323 STAGE III PRESSURE ULCER OF LEFT BUTTOCK: ICD-10-CM

## 2021-06-28 DIAGNOSIS — Z98.890 S/P FLAP GRAFT: Primary | ICD-10-CM

## 2021-06-28 PROCEDURE — 99204 OFFICE O/P NEW MOD 45 MIN: CPT

## 2021-06-28 RX ORDER — DALBAVANCIN 500 MG/25ML
1500 INJECTION, POWDER, FOR SOLUTION INTRAVENOUS ONCE
Qty: 75 ML | Refills: 0 | OUTPATIENT
Start: 2021-06-28 | End: 2021-06-28

## 2021-06-28 RX ORDER — SULFAMETHOXAZOLE AND TRIMETHOPRIM 800; 160 MG/1; MG/1
1 TABLET ORAL 2 TIMES DAILY
Qty: 20 TABLET | Refills: 0 | Status: SHIPPED | OUTPATIENT
Start: 2021-06-28 | End: 2021-07-08

## 2021-07-09 ENCOUNTER — HOSPITAL ENCOUNTER (OUTPATIENT)
Dept: WOUND CARE | Facility: HOSPITAL | Age: 61
Discharge: HOME | End: 2021-07-09
Attending: PREVENTIVE MEDICINE
Payer: MEDICARE

## 2021-07-09 VITALS
WEIGHT: 165 LBS | SYSTOLIC BLOOD PRESSURE: 164 MMHG | HEIGHT: 70 IN | TEMPERATURE: 97 F | DIASTOLIC BLOOD PRESSURE: 88 MMHG | BODY MASS INDEX: 23.62 KG/M2 | HEART RATE: 77 BPM

## 2021-07-09 DIAGNOSIS — G82.20 PARAPLEGIA: ICD-10-CM

## 2021-07-09 DIAGNOSIS — L02.811 ABSCESS OF HEAD: ICD-10-CM

## 2021-07-09 DIAGNOSIS — Z98.890 S/P FLAP GRAFT: ICD-10-CM

## 2021-07-09 DIAGNOSIS — L89.323 STAGE III PRESSURE ULCER OF LEFT BUTTOCK: Primary | ICD-10-CM

## 2021-07-09 PROCEDURE — 11042 DBRDMT SUBQ TIS 1ST 20SQCM/<: CPT

## 2021-07-09 PROCEDURE — 27201912 HC WOUND CARE DEBRIDEMENT SUPPLIES

## 2021-07-23 ENCOUNTER — HOSPITAL ENCOUNTER (OUTPATIENT)
Dept: WOUND CARE | Facility: HOSPITAL | Age: 61
Discharge: HOME | End: 2021-07-23
Attending: PREVENTIVE MEDICINE
Payer: MEDICARE

## 2021-07-23 VITALS
SYSTOLIC BLOOD PRESSURE: 105 MMHG | TEMPERATURE: 98 F | DIASTOLIC BLOOD PRESSURE: 56 MMHG | HEART RATE: 77 BPM | WEIGHT: 165 LBS | HEIGHT: 70 IN | BODY MASS INDEX: 23.62 KG/M2

## 2021-07-23 DIAGNOSIS — L89.323 STAGE III PRESSURE ULCER OF LEFT BUTTOCK: Primary | ICD-10-CM

## 2021-07-23 DIAGNOSIS — Z98.890 S/P FLAP GRAFT: ICD-10-CM

## 2021-07-23 DIAGNOSIS — G82.20 PARAPLEGIA: ICD-10-CM

## 2021-07-23 PROCEDURE — 99212 OFFICE O/P EST SF 10 MIN: CPT

## 2021-08-06 ENCOUNTER — HOSPITAL ENCOUNTER (OUTPATIENT)
Dept: WOUND CARE | Facility: HOSPITAL | Age: 61
Discharge: HOME | End: 2021-08-06
Attending: PREVENTIVE MEDICINE
Payer: MEDICARE

## 2021-08-06 VITALS
TEMPERATURE: 97 F | DIASTOLIC BLOOD PRESSURE: 79 MMHG | BODY MASS INDEX: 23.62 KG/M2 | HEART RATE: 75 BPM | HEIGHT: 70 IN | WEIGHT: 165 LBS | SYSTOLIC BLOOD PRESSURE: 145 MMHG

## 2021-08-06 DIAGNOSIS — Z98.890 S/P FLAP GRAFT: ICD-10-CM

## 2021-08-06 DIAGNOSIS — G82.20 PARAPLEGIA: ICD-10-CM

## 2021-08-06 DIAGNOSIS — L89.323 STAGE III PRESSURE ULCER OF LEFT BUTTOCK: Primary | ICD-10-CM

## 2021-08-06 DIAGNOSIS — L89.312: ICD-10-CM

## 2021-08-06 PROCEDURE — 99214 OFFICE O/P EST MOD 30 MIN: CPT | Mod: 27

## 2021-08-11 DIAGNOSIS — L89.323 STAGE III PRESSURE ULCER OF LEFT BUTTOCK: Primary | ICD-10-CM

## 2021-08-20 ENCOUNTER — HOSPITAL ENCOUNTER (OUTPATIENT)
Dept: WOUND CARE | Facility: HOSPITAL | Age: 61
Discharge: HOME | End: 2021-08-20
Attending: PREVENTIVE MEDICINE
Payer: MEDICARE

## 2021-08-20 VITALS
SYSTOLIC BLOOD PRESSURE: 132 MMHG | WEIGHT: 165 LBS | HEART RATE: 77 BPM | TEMPERATURE: 98 F | HEIGHT: 70 IN | BODY MASS INDEX: 23.62 KG/M2 | DIASTOLIC BLOOD PRESSURE: 73 MMHG

## 2021-08-20 DIAGNOSIS — L89.323 STAGE III PRESSURE ULCER OF LEFT BUTTOCK: Primary | ICD-10-CM

## 2021-08-20 DIAGNOSIS — G82.20 PARAPLEGIA: ICD-10-CM

## 2021-08-20 PROCEDURE — 99213 OFFICE O/P EST LOW 20 MIN: CPT

## 2021-08-22 ENCOUNTER — TELEPHONE (OUTPATIENT)
Dept: EMERGENCY MEDICINE | Facility: HOSPITAL | Age: 61
End: 2021-08-22

## 2021-08-22 DIAGNOSIS — U07.1 COVID-19 VIRUS INFECTION: Primary | ICD-10-CM

## 2021-08-23 ENCOUNTER — CLINICAL SUPPORT (OUTPATIENT)
Dept: URGENT CARE | Facility: CLINIC | Age: 61
End: 2021-08-23
Payer: MEDICARE

## 2021-08-23 DIAGNOSIS — Z11.59 ENCOUNTER FOR SCREENING FOR OTHER VIRAL DISEASES: Primary | ICD-10-CM

## 2021-08-23 LAB
CTP QC/QA: YES
SARS-COV-2 RDRP RESP QL NAA+PROBE: NEGATIVE

## 2021-08-23 PROCEDURE — U0002 COVID-19 LAB TEST NON-CDC: HCPCS | Mod: QW,CR,S$GLB, | Performed by: NURSE PRACTITIONER

## 2021-08-23 PROCEDURE — 99211 PR OFFICE/OUTPT VISIT, EST, LEVL I: ICD-10-PCS | Mod: S$GLB,,, | Performed by: NURSE PRACTITIONER

## 2021-08-23 PROCEDURE — 99211 OFF/OP EST MAY X REQ PHY/QHP: CPT | Mod: S$GLB,,, | Performed by: NURSE PRACTITIONER

## 2021-08-23 PROCEDURE — U0002: ICD-10-PCS | Mod: QW,CR,S$GLB, | Performed by: NURSE PRACTITIONER

## 2021-09-10 ENCOUNTER — HOSPITAL ENCOUNTER (OUTPATIENT)
Dept: WOUND CARE | Facility: HOSPITAL | Age: 61
Discharge: HOME | End: 2021-09-10
Attending: PREVENTIVE MEDICINE
Payer: MEDICARE

## 2021-09-10 VITALS
WEIGHT: 165 LBS | DIASTOLIC BLOOD PRESSURE: 72 MMHG | HEART RATE: 82 BPM | BODY MASS INDEX: 23.62 KG/M2 | SYSTOLIC BLOOD PRESSURE: 120 MMHG | HEIGHT: 70 IN | TEMPERATURE: 97 F

## 2021-09-10 DIAGNOSIS — L89.323 STAGE III PRESSURE ULCER OF LEFT BUTTOCK: Primary | ICD-10-CM

## 2021-09-10 DIAGNOSIS — Z98.890 S/P FLAP GRAFT: ICD-10-CM

## 2021-09-10 DIAGNOSIS — G82.20 PARAPLEGIA: ICD-10-CM

## 2021-09-10 PROCEDURE — 99213 OFFICE O/P EST LOW 20 MIN: CPT

## 2021-10-01 ENCOUNTER — HOSPITAL ENCOUNTER (OUTPATIENT)
Dept: WOUND CARE | Facility: HOSPITAL | Age: 61
Discharge: HOME | End: 2021-10-01
Attending: PREVENTIVE MEDICINE
Payer: MEDICARE

## 2021-10-01 VITALS — SYSTOLIC BLOOD PRESSURE: 100 MMHG | DIASTOLIC BLOOD PRESSURE: 57 MMHG | TEMPERATURE: 98 F | HEART RATE: 78 BPM

## 2021-10-01 DIAGNOSIS — G82.20 PARAPLEGIA: ICD-10-CM

## 2021-10-01 DIAGNOSIS — L02.811 SCALP ABSCESS: ICD-10-CM

## 2021-10-01 DIAGNOSIS — Z98.890 S/P FLAP GRAFT: ICD-10-CM

## 2021-10-01 DIAGNOSIS — L89.323 STAGE III PRESSURE ULCER OF LEFT BUTTOCK: Primary | ICD-10-CM

## 2021-10-01 PROCEDURE — 17250 CHEM CAUT OF GRANLTJ TISSUE: CPT

## 2021-10-01 RX ORDER — SULFAMETHOXAZOLE AND TRIMETHOPRIM 800; 160 MG/1; MG/1
1 TABLET ORAL 2 TIMES DAILY
Qty: 20 TABLET | Refills: 0 | Status: SHIPPED | OUTPATIENT
Start: 2021-10-01 | End: 2021-10-11

## 2021-10-15 ENCOUNTER — HOSPITAL ENCOUNTER (OUTPATIENT)
Dept: WOUND CARE | Facility: HOSPITAL | Age: 61
Discharge: HOME | End: 2021-10-15
Attending: PREVENTIVE MEDICINE
Payer: MEDICARE

## 2021-10-15 VITALS
BODY MASS INDEX: 23.62 KG/M2 | SYSTOLIC BLOOD PRESSURE: 122 MMHG | HEART RATE: 79 BPM | DIASTOLIC BLOOD PRESSURE: 76 MMHG | TEMPERATURE: 98 F | HEIGHT: 70 IN | WEIGHT: 165 LBS

## 2021-10-15 DIAGNOSIS — G82.20 PARAPLEGIA: ICD-10-CM

## 2021-10-15 DIAGNOSIS — L89.323 STAGE III PRESSURE ULCER OF LEFT BUTTOCK: Primary | ICD-10-CM

## 2021-10-15 DIAGNOSIS — Z98.890 S/P FLAP GRAFT: ICD-10-CM

## 2021-10-15 PROCEDURE — 99214 OFFICE O/P EST MOD 30 MIN: CPT

## 2021-11-05 ENCOUNTER — HOSPITAL ENCOUNTER (OUTPATIENT)
Dept: WOUND CARE | Facility: HOSPITAL | Age: 61
Discharge: HOME | End: 2021-11-05
Attending: PREVENTIVE MEDICINE
Payer: MEDICARE

## 2021-11-05 VITALS
TEMPERATURE: 97 F | SYSTOLIC BLOOD PRESSURE: 181 MMHG | DIASTOLIC BLOOD PRESSURE: 95 MMHG | HEIGHT: 70 IN | BODY MASS INDEX: 23.62 KG/M2 | HEART RATE: 77 BPM | WEIGHT: 165 LBS

## 2021-11-05 DIAGNOSIS — Z98.890 S/P FLAP GRAFT: ICD-10-CM

## 2021-11-05 DIAGNOSIS — G82.20 PARAPLEGIA: ICD-10-CM

## 2021-11-05 DIAGNOSIS — L89.323 STAGE III PRESSURE ULCER OF LEFT BUTTOCK: Primary | ICD-10-CM

## 2021-11-05 PROCEDURE — 27201912 HC WOUND CARE DEBRIDEMENT SUPPLIES

## 2021-11-05 PROCEDURE — 11042 DBRDMT SUBQ TIS 1ST 20SQCM/<: CPT

## 2021-11-15 RX ORDER — DOXYCYCLINE HYCLATE 100 MG
100 TABLET ORAL EVERY 12 HOURS
Qty: 20 TABLET | Refills: 0 | Status: SHIPPED | OUTPATIENT
Start: 2021-11-15 | End: 2021-11-25

## 2021-11-19 ENCOUNTER — HOSPITAL ENCOUNTER (OUTPATIENT)
Dept: WOUND CARE | Facility: HOSPITAL | Age: 61
Discharge: HOME | End: 2021-11-19
Attending: PREVENTIVE MEDICINE
Payer: MEDICARE

## 2021-11-19 VITALS
WEIGHT: 165 LBS | SYSTOLIC BLOOD PRESSURE: 187 MMHG | BODY MASS INDEX: 23.62 KG/M2 | DIASTOLIC BLOOD PRESSURE: 101 MMHG | TEMPERATURE: 99 F | HEART RATE: 111 BPM | HEIGHT: 70 IN

## 2021-11-19 DIAGNOSIS — L89.323 STAGE III PRESSURE ULCER OF LEFT BUTTOCK: Primary | ICD-10-CM

## 2021-11-19 DIAGNOSIS — Z98.890 S/P FLAP GRAFT: ICD-10-CM

## 2021-11-19 DIAGNOSIS — G82.20 PARAPLEGIA: ICD-10-CM

## 2021-11-19 PROCEDURE — 11042 DBRDMT SUBQ TIS 1ST 20SQCM/<: CPT

## 2021-11-19 PROCEDURE — 27201912 HC WOUND CARE DEBRIDEMENT SUPPLIES

## 2021-12-03 ENCOUNTER — HOSPITAL ENCOUNTER (OUTPATIENT)
Dept: WOUND CARE | Facility: HOSPITAL | Age: 61
Discharge: HOME | End: 2021-12-03
Attending: PREVENTIVE MEDICINE
Payer: MEDICARE

## 2021-12-03 VITALS
HEART RATE: 80 BPM | WEIGHT: 165 LBS | BODY MASS INDEX: 23.62 KG/M2 | DIASTOLIC BLOOD PRESSURE: 86 MMHG | SYSTOLIC BLOOD PRESSURE: 179 MMHG | HEIGHT: 70 IN

## 2021-12-03 DIAGNOSIS — L89.323 STAGE III PRESSURE ULCER OF LEFT BUTTOCK: Primary | ICD-10-CM

## 2021-12-03 DIAGNOSIS — L89.312: ICD-10-CM

## 2021-12-03 DIAGNOSIS — G82.20 PARAPLEGIA: ICD-10-CM

## 2021-12-03 DIAGNOSIS — Z98.890 S/P FLAP GRAFT: ICD-10-CM

## 2021-12-03 PROCEDURE — 27201912 HC WOUND CARE DEBRIDEMENT SUPPLIES

## 2021-12-03 PROCEDURE — 11042 DBRDMT SUBQ TIS 1ST 20SQCM/<: CPT

## 2021-12-17 ENCOUNTER — HOSPITAL ENCOUNTER (OUTPATIENT)
Dept: WOUND CARE | Facility: HOSPITAL | Age: 61
Discharge: HOME | End: 2021-12-17
Attending: PREVENTIVE MEDICINE
Payer: MEDICARE

## 2021-12-17 VITALS
BODY MASS INDEX: 23.62 KG/M2 | SYSTOLIC BLOOD PRESSURE: 127 MMHG | WEIGHT: 165 LBS | TEMPERATURE: 98 F | DIASTOLIC BLOOD PRESSURE: 65 MMHG | HEART RATE: 81 BPM | HEIGHT: 70 IN

## 2021-12-17 DIAGNOSIS — L89.323 STAGE III PRESSURE ULCER OF LEFT BUTTOCK: Primary | ICD-10-CM

## 2021-12-17 DIAGNOSIS — G82.20 PARAPLEGIA: ICD-10-CM

## 2021-12-17 DIAGNOSIS — L89.312: ICD-10-CM

## 2021-12-17 PROCEDURE — 99212 OFFICE O/P EST SF 10 MIN: CPT

## 2022-01-14 ENCOUNTER — HOSPITAL ENCOUNTER (OUTPATIENT)
Dept: WOUND CARE | Facility: HOSPITAL | Age: 62
Discharge: HOME | End: 2022-01-14
Attending: PREVENTIVE MEDICINE
Payer: MEDICARE

## 2022-01-14 DIAGNOSIS — G82.20 PARAPLEGIA: ICD-10-CM

## 2022-01-14 DIAGNOSIS — Z98.890 S/P FLAP GRAFT: ICD-10-CM

## 2022-01-14 DIAGNOSIS — L89.323 STAGE III PRESSURE ULCER OF LEFT BUTTOCK: ICD-10-CM

## 2022-01-14 DIAGNOSIS — L89.324 PRESSURE INJURY OF LEFT ISCHIUM, STAGE 4: ICD-10-CM

## 2022-01-14 DIAGNOSIS — L89.309: Primary | ICD-10-CM

## 2022-01-14 PROCEDURE — 27201912 HC WOUND CARE DEBRIDEMENT SUPPLIES

## 2022-01-14 PROCEDURE — 11042 DBRDMT SUBQ TIS 1ST 20SQCM/<: CPT

## 2022-01-14 NOTE — PROCEDURES
"Debridement    Date/Time: 1/14/2022 1:00 PM  Performed by: Ugo Stone MD  Authorized by: Ugo Stone MD     Time out: Immediately prior to procedure a "time out" was called to verify the correct patient, procedure, equipment, support staff and site/side marked as required.    Consent Done?:  Yes (Written)  Local anesthesia used?: Yes    Local anesthetic:  Topical anesthetic    Wound Details:    Location:  Left buttock    Type of Debridement:  Excisional       Length (cm):  0.3       Area (sq cm):  0.12       Width (cm):  0.4       Percent Debrided (%):  100       Depth (cm):  0.1       Total Area Debrided (sq cm):  0.12    Depth of debridement:  Subcutaneous tissue    Tissue debrided:  Subcutaneous    Devitalized tissue debrided:  Slough, Fibrin and Exudate    Instruments:  Curette    Bleeding:  Minimal  Hemostasis Achieved: Yes    Method Used:  Pressure  Patient tolerance:  Patient tolerated the procedure well with no immediate complications      "

## 2022-01-14 NOTE — PROGRESS NOTES
Subjective:       Patient ID: Derik Fonesca is a 61 y.o. male.    Chief Complaint: Wound Care    Patient to wound care ischial wounds, new orders noted and sent to [facility] for supplies.     Review of Systems   All other systems reviewed and are negative.        Objective:         Physical Exam       Altered Skin Integrity 06/04/21 1317 Left Buttocks Ulceration Full thickness tissue loss. Subcutaneous fat may be visible but bone, tendon or muscle are not exposed (Active)   06/04/21 1317   Altered Skin Integrity Present on Admission:    Side: Left   Orientation:    Location: Buttocks   Wound Number:    Is this injury device related?: No   Primary Wound Type: Ulceration   Description of Altered Skin Integrity: Full thickness tissue loss. Subcutaneous fat may be visible but bone, tendon or muscle are not exposed   Removal Indication and Assessment:    Wound Outcome:    (Retired) Wound Length (cm):    (Retired) Wound Width (cm):    (Retired) Depth (cm):    Wound Description (Comments):    Removal Indications:    Wound Image   01/14/22 1300   Dressing Appearance Intact;Moist drainage 01/14/22 1300   Drainage Amount Moderate 01/14/22 1300   Drainage Characteristics/Odor Serosanguineous 01/14/22 1300   Appearance Intact;Pink;Yellow 01/14/22 1300   Tissue loss description Full thickness 01/14/22 1300   Red (%), Wound Tissue Color 50 % 01/14/22 1300   Yellow (%), Wound Tissue Color 50 % 01/14/22 1300   Periwound Area Excoriated 01/14/22 1300   Wound Edges Irregular 01/14/22 1300   Wound Length (cm) 0.3 cm 01/14/22 1300   Wound Width (cm) 0.4 cm 01/14/22 1300   Wound Depth (cm) 2.1 cm 01/14/22 1300   Wound Volume (cm^3) 0.252 cm^3 01/14/22 1300   Wound Surface Area (cm^2) 0.12 cm^2 01/14/22 1300   Undermining (depth (cm)/location) 1.2 @3 01/14/22 1300   Care Cleansed with:;Sterile normal saline 01/14/22 1300   Dressing Changed;Applied;Hydrofiber;Island/border 01/14/22 1300   Periwound Care Skin barrier film applied 01/14/22  1300   Off Loading Other (see comments) 01/14/22 1300   Dressing Change Due 02/04/22 01/14/22 1300            Wound 12/03/21 1300 Ulceration Right Ischial tuberosity (Active)   12/03/21 1300    Pre-existing: Yes   Primary Wound Type: Ulceration   Side: Right   Orientation:    Location: Ischial tuberosity   Wound Number:    Ankle-Brachial Index:    Pulses:    Removal Indication and Assessment:    Wound Outcome:    (Retired) Wound Type:    (Retired) Wound Length (cm):    (Retired) Wound Width (cm):    (Retired) Depth (cm):    Wound Description (Comments):    Removal Indications:    Wound Image   01/14/22 1300   Dressing Appearance Intact;Moist drainage 01/14/22 1300   Drainage Amount Moderate 01/14/22 1300   Drainage Characteristics/Odor Serosanguineous 01/14/22 1300   Appearance Intact;Red;Yellow 01/14/22 1300   Tissue loss description Partial thickness 01/14/22 1300   Red (%), Wound Tissue Color 80 % 01/14/22 1300   Yellow (%), Wound Tissue Color 20 % 01/14/22 1300   Periwound Area Excoriated 01/14/22 1300   Wound Edges Irregular 01/14/22 1300   Wound Length (cm) 1.5 cm 01/14/22 1300   Wound Width (cm) 0.6 cm 01/14/22 1300   Wound Depth (cm) 0.1 cm 01/14/22 1300   Wound Volume (cm^3) 0.09 cm^3 01/14/22 1300   Wound Surface Area (cm^2) 0.9 cm^2 01/14/22 1300   Care Cleansed with:;Sterile normal saline 01/14/22 1300   Dressing Applied;Changed;Hydrofiber;Island/border 01/14/22 1300   Periwound Care Skin barrier film applied 01/14/22 1300   Off Loading Other (see comments) 01/14/22 1300   Dressing Change Due 02/04/22 01/14/22 1300         Assessment:         ICD-10-CM ICD-9-CM   1. Decubitus ulcer of ischial area  L89.309 707.05     707.20   2. 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 Paraplegia  G82.20 344.1   4. S/P flap graft  Z98.890 V45.89         Plan:   Tissue pathology and/or culture taken:  [] Yes [x] No   Sharp debridement performed:   [x] Yes [] No   Labs ordered this  visit:   [] Yes [x] No   Imaging ordered this visit:   [] Yes [x] No           Orders Placed This Encounter   Procedures    Change dressing     Left  and right buttock pressure ulcers:   Cleanse wound with: Normal Saline   Lidocaine: PRN   Silver nitrate: PRN   Periwound care: Cavilon, Maintain dry and clean, Calmoseptine to excoriation   Primary dressing: Hydrofera Ready  Secondary dressing: Border dressing 3x3 to each wound   Offloading: Roho cushion     Frequency: Mon, Wed, Fri and PRN per Cg.  Cg instructed/demonstrated wound care and verbalized understanding   Follow-up: with Dr. Stone in 3 weeks 02/04/22  Supplies needed for at home wound care 3 x per week:     Saline   Q-tips   Gauze   Hydrofera Ready   Border dressing 3x3        Follow up in about 3 weeks (around 2/4/2022).

## 2022-02-04 ENCOUNTER — HOSPITAL ENCOUNTER (OUTPATIENT)
Dept: WOUND CARE | Facility: HOSPITAL | Age: 62
Discharge: HOME | End: 2022-02-04
Attending: PREVENTIVE MEDICINE
Payer: MEDICARE

## 2022-02-04 VITALS
BODY MASS INDEX: 23.62 KG/M2 | WEIGHT: 165 LBS | DIASTOLIC BLOOD PRESSURE: 84 MMHG | TEMPERATURE: 97 F | HEART RATE: 83 BPM | SYSTOLIC BLOOD PRESSURE: 134 MMHG | HEIGHT: 70 IN

## 2022-02-04 DIAGNOSIS — L89.313 PRESSURE INJURY OF RIGHT ISCHIUM, STAGE 3: Primary | ICD-10-CM

## 2022-02-04 DIAGNOSIS — L89.312: ICD-10-CM

## 2022-02-04 DIAGNOSIS — G82.20 PARAPLEGIA: ICD-10-CM

## 2022-02-04 DIAGNOSIS — Z98.890 S/P FLAP GRAFT: ICD-10-CM

## 2022-02-04 PROCEDURE — 15271 SKIN SUB GRAFT TRNK/ARM/LEG: CPT

## 2022-02-04 NOTE — PROGRESS NOTES
Subjective:       Patient ID: Derik Fonseca is a 61 y.o. male.    Chief Complaint: Pressure Ulcer and Non-healing Wound Follow Up    2/4/22 Patient was seen in clinic for eval and treatment of bilateral buttocks pressure injuries.  [facility] product NO-1230-d 2x3cm Donor # [id number]; ID# [id number]; expiration 09/27/2026 +NS  Lot [id number] exp 2024-01-01, [facility] [place] [id number] applied by MD.  Tolerated procedure and dressing changes well. Caregiver at bed site.  Educated on proper dressing application.    Review of Systems   All other systems reviewed and are negative.        Objective:      Temp:  [97.1 °F (36.2 °C)]   Pulse:  [83]   BP: (134)/(84)   Physical Exam       Altered Skin Integrity 06/04/21 1317 Left Buttocks Ulceration Full thickness tissue loss. Subcutaneous fat may be visible but bone, tendon or muscle are not exposed (Active)   06/04/21 1317   Altered Skin Integrity Present on Admission:    Side: Left   Orientation:    Location: Buttocks   Wound Number:    Is this injury device related?: No   Primary Wound Type: Ulceration   Description of Altered Skin Integrity: Full thickness tissue loss. Subcutaneous fat may be visible but bone, tendon or muscle are not exposed   Removal Indication and Assessment:    Wound Outcome:    (Retired) Wound Length (cm):    (Retired) Wound Width (cm):    (Retired) Depth (cm):    Wound Description (Comments):    Removal Indications:    Wound Image   02/04/22 1327   Description of Altered Skin Integrity Full thickness tissue loss with exposed bone, tendon, or muscle. Often includes undermining and tunneling. May extend into muscle and/or supporting structures. 02/04/22 1327   Dressing Appearance Intact;Moist drainage 02/04/22 1327   Drainage Amount Small 02/04/22 1327   Drainage Characteristics/Odor Serous 02/04/22 1327   Tissue loss description Full thickness 02/04/22 1327   Red (%), Wound Tissue Color 100 % 02/04/22 1327   Periwound Area Intact;Satellite lesion 02/04/22 1327    Wound Edges Open 02/04/22 1327   Wound Length (cm) 0.3 cm 02/04/22 1327   Wound Width (cm) 0.3 cm 02/04/22 1327   Wound Depth (cm) 1.2 cm 02/04/22 1327   Wound Volume (cm^3) 0.108 cm^3 02/04/22 1327   Wound Surface Area (cm^2) 0.09 cm^2 02/04/22 1327   Undermining (depth (cm)/location) 12-4=3cm 02/04/22 1327   Care Soap and water;Sterile normal saline;Cleansed with: 02/04/22 1327   Dressing Hydrofiber 02/04/22 1327   Periwound Care Skin barrier film applied 02/04/22 1327   Dressing Change Due 02/07/22 02/04/22 1327            Wound 12/03/21 1300 Ulceration Right Ischial tuberosity (Active)   12/03/21 1300    Pre-existing: Yes   Primary Wound Type: Ulceration   Side: Right   Orientation:    Location: Ischial tuberosity   Wound Number:    Ankle-Brachial Index:    Pulses:    Removal Indication and Assessment:    Wound Outcome:    (Retired) Wound Type:    (Retired) Wound Length (cm):    (Retired) Wound Width (cm):    (Retired) Depth (cm):    Wound Description (Comments):    Removal Indications:    Wound Image   02/04/22 1333   Dressing Appearance Intact;Moist drainage 02/04/22 1333   Drainage Amount Moderate 02/04/22 1333   Drainage Characteristics/Odor Serous 02/04/22 1333   Tissue loss description Full thickness 02/04/22 1333   Red (%), Wound Tissue Color 25 % 02/04/22 1333   Yellow (%), Wound Tissue Color 75 % 02/04/22 1333   Periwound Area Intact;Satellite lesion 02/04/22 1333   Wound Edges Open 02/04/22 1333   Wound Length (cm) 1.3 cm 02/04/22 1333   Wound Width (cm) 1.3 cm 02/04/22 1333   Wound Depth (cm) 0.1 cm 02/04/22 1333   Wound Volume (cm^3) 0.169 cm^3 02/04/22 1333   Wound Surface Area (cm^2) 1.69 cm^2 02/04/22 1333   Care Cleansed with:;Soap and water;Sterile normal saline 02/04/22 1333   Dressing Applied;Hydrofiber 02/04/22 1333   Periwound Care Skin barrier film applied 02/04/22 1333   Dressing Change Due 02/07/22 02/04/22 1333       Wound Location: left buttock  Skin Substitute: josy Nails  By: Ugo Stone  Time-out Taken: Yes  Location:     [] Genitalia/Hands/Feet/Multiple Digits    [] Scalp/Face/Neck/Ears    [x]Trunk/Arms/Legs  Application Area: (sq cm): 2   Product Applied: (sq cm): 6  Product Waste (sq cm): 0  Fenestrated: No   Instrument:    [] Blade [x] Curette  []Forceps  []Nippers    [] Rongeur [] Scissors  []Others:   Secured with: Steri-strips and contact layer  Dressing Applied: Yes  Response to Treatment:Well tolerated by patient.      Assessment:         ICD-10-CM ICD-9-CM   1. Pressure injury of right ischium, stage 3  L89.313 707.05     707.23   2. Paraplegia  G82.20 344.1   3. S/P flap graft  Z98.890 V45.89   4. Pressure ulcer of ischium, stage 2, right  L89.312 707.05     707.22         Plan:   Tissue pathology and/or culture taken:  [] Yes [x] No   Sharp debridement performed:   [] Yes [x] No   Labs ordered this visit:   [] Yes [x] No   Imaging ordered this visit:   [] Yes [x] No           Orders Placed This Encounter   Procedures    Change dressing     Right buttock pressure ulcer:   Cleanse wound with: Normal Saline   Lidocaine: PRN   Silver nitrate: PRN   Periwound care: Cavilon, Maintain dry and clean, Calmoseptine to excoriation   Primary dressing: Hydrofera Ready   Secondary dressing: Border dressing 3x3      Left buttock pressure ulcer:   Cleanse wound with: Normal Saline   Lidocaine: PRN   Silver nitrate: PRN   Periwound care: Cavilon, Maintain dry and clean, Calmoseptine to excoriation   Primary dressing: NuShield applied in clinic follow up by Yogesh  Secondary dressing: Border dressing 3x3 tOffloading: Roho cushion       Frequency: Mon, Wed, Fri and PRN per Cg.  Cg instructed/demonstrated wound care and verbalized understanding   Follow-up: with Dr. Stone in 3 weeks 02/18/22     Supplies needed for at home wound care 3 x per week:   Saline   Q-tips   Gauze 3x3  Hydrofera Ready   Border dressing 3x3  Promogran        Follow up in about 2 weeks (around 2/18/2022) for  Dr Wallace.

## 2022-02-16 ENCOUNTER — PATIENT MESSAGE (OUTPATIENT)
Dept: RESEARCH | Facility: HOSPITAL | Age: 62
End: 2022-02-16
Payer: MEDICARE

## 2022-02-18 ENCOUNTER — HOSPITAL ENCOUNTER (OUTPATIENT)
Dept: WOUND CARE | Facility: HOSPITAL | Age: 62
Discharge: HOME | End: 2022-02-18
Attending: PREVENTIVE MEDICINE
Payer: MEDICARE

## 2022-02-18 VITALS
WEIGHT: 165 LBS | HEART RATE: 104 BPM | TEMPERATURE: 98 F | DIASTOLIC BLOOD PRESSURE: 90 MMHG | HEIGHT: 70 IN | SYSTOLIC BLOOD PRESSURE: 156 MMHG | BODY MASS INDEX: 23.62 KG/M2

## 2022-02-18 DIAGNOSIS — Z98.890 S/P FLAP GRAFT: ICD-10-CM

## 2022-02-18 DIAGNOSIS — G82.20 PARAPLEGIA: ICD-10-CM

## 2022-02-18 DIAGNOSIS — L89.312: ICD-10-CM

## 2022-02-18 DIAGNOSIS — L89.313 PRESSURE INJURY OF RIGHT ISCHIUM, STAGE 3: Primary | ICD-10-CM

## 2022-02-18 PROCEDURE — 15271 SKIN SUB GRAFT TRNK/ARM/LEG: CPT

## 2022-02-18 PROCEDURE — 11045 DBRDMT SUBQ TISS EACH ADDL: CPT

## 2022-02-18 NOTE — PROGRESS NOTES
Subjective:       Patient ID: Derik Fonseca is a 61 y.o. male.    Chief Complaint: Pressure Ulcer    2/18/22:  F/U with Dr. Stone for right and left buttock wounds.  Sharps debridement to both wounds followed by Nushield graft to Left buttock per Dr. Stone.  Gh[facility]hyam [place]#[id number][place] [facility] [id number], Exp [place]/21/[place]26. No saline used.   Care tolerated well.  Supply order sent to [employer].      Review of Systems   All other systems reviewed and are negative.        Objective:      Temp:  [97.7 °F (36.5 °C)]   Pulse:  [104]   BP: (156)/(90)   Physical Exam       Altered Skin Integrity 06/04/21 1317 Left Buttocks Ulceration Full thickness tissue loss. Subcutaneous fat may be visible but bone, tendon or muscle are not exposed (Active)   06/04/21 1317   Altered Skin Integrity Present on Admission:    Side: Left   Orientation:    Location: Buttocks   Wound Number:    Is this injury device related?: No   Primary Wound Type: Ulceration   Description of Altered Skin Integrity: Full thickness tissue loss. Subcutaneous fat may be visible but bone, tendon or muscle are not exposed   Removal Indication and Assessment:    Wound Outcome:    (Retired) Wound Length (cm):    (Retired) Wound Width (cm):    (Retired) Depth (cm):    Wound Description (Comments):    Removal Indications:    Wound Image   02/18/22 1300   Description of Altered Skin Integrity Full thickness tissue loss. Subcutaneous fat may be visible but bone, tendon or muscle are not exposed 02/18/22 1300   Dressing Appearance Intact;Moist drainage 02/18/22 1300   Drainage Amount Moderate 02/18/22 1300   Drainage Characteristics/Odor Serosanguineous 02/18/22 1300   Appearance Pink;Moist 02/18/22 1300   Tissue loss description Full thickness 02/18/22 1300   Red (%), Wound Tissue Color 100 % 02/18/22 1300   Periwound Area Macerated 02/18/22 1300   Wound Edges Irregular;Open 02/18/22 1300   Wound Length (cm) 0.2 cm 02/18/22 1300   Wound Width (cm) 0.4 cm 02/18/22 1300    Wound Depth (cm) 0.9 cm 02/18/22 1300   Wound Volume (cm^3) 0.072 cm^3 02/18/22 1300   Wound Surface Area (cm^2) 0.08 cm^2 02/18/22 1300   Undermining (depth (cm)/location) 2cm  from 12-5 oclock 02/18/22 1300   Care Cleansed with:;Sterile normal saline 02/18/22 1300   Dressing Applied;Calcium alginate;Island/border;Growth factor 02/18/22 1300   Periwound Care Skin barrier film applied 02/18/22 1300   Dressing Change Due 02/21/22 02/18/22 1300            Wound 12/03/21 1300 Ulceration Right Ischial tuberosity (Active)   12/03/21 1300    Pre-existing: Yes   Primary Wound Type: Ulceration   Side: Right   Orientation:    Location: Ischial tuberosity   Wound Number:    Ankle-Brachial Index:    Pulses:    Removal Indication and Assessment:    Wound Outcome:    (Retired) Wound Type:    (Retired) Wound Length (cm):    (Retired) Wound Width (cm):    (Retired) Depth (cm):    Wound Description (Comments):    Removal Indications:    Wound Image   02/18/22 1300   Dressing Appearance Intact;Moist drainage 02/18/22 1300   Drainage Amount Moderate 02/18/22 1300   Drainage Characteristics/Odor Serosanguineous 02/18/22 1300   Tissue loss description Full thickness 02/18/22 1300   Red (%), Wound Tissue Color 40 % 02/18/22 1300   Yellow (%), Wound Tissue Color 60 % 02/18/22 1300   Periwound Area Intact;[employer] 02/18/22 1300   Wound Edges Open;Defined 02/18/22 1300   Wound Length (cm) 1 cm 02/18/22 1300   Wound Width (cm) 1 cm 02/18/22 1300   Wound Depth (cm) 0.1 cm 02/18/22 1300   Wound Volume (cm^3) 0.1 cm^3 02/18/22 1300   Wound Surface Area (cm^2) 1 cm^2 02/18/22 1300   Care Cleansed with:;Sterile normal saline 02/18/22 1300   Dressing Applied;Changed;Island/border 02/18/22 1300   Periwound Care Absorptive dressing applied;Skin barrier film applied 02/18/22 1300   Dressing Change Due 02/21/22 02/18/22 1300       Wound Location: left buttocks  Skin Substitute: NuShield  Performed By: Ugo Stone  Time-out Taken: Yes  Location:      [] Genitalia/Hands/Feet/Multiple Digits    [] Scalp/Face/Neck/Ears    [x]Trunk/Arms/Legs  Application Area: (sq cm): 2cm   Product Applied: (sq cm): 6  Product Waste (sq cm): 0  Fenestrated: No   Instrument:    [] Blade [x] Curette  []Forceps  []Nippers    [] Rongeur [] Scissors  []Others:   Secured with: Steri-strips and contact layer  Dressing Applied: Yes  Response to Treatment:Well tolerated by patient.      Assessment:         ICD-10-CM ICD-9-CM   1. Pressure injury of right ischium, stage 3  L89.313 707.05     707.23   2. Pressure ulcer of ischium, stage 2, right  L89.312 707.05     707.22   3. Paraplegia  G82.20 344.1   4. S/P flap graft  Z98.890 V45.89         Plan:   Tissue pathology and/or culture taken:  [] Yes [x] No   Sharp debridement performed:   [x] Yes [] No   Labs ordered this visit:   [] Yes [x] No   Imaging ordered this visit:   [] Yes [x] No           Orders Placed This Encounter   Procedures    Change dressing     Right buttock pressure ulcer:   Cleanse wound with: Normal Saline   Lidocaine: PRN   Silver nitrate: PRN   Periwound care: Cavilon, Calmoseptine PRN  Primary dressing: Hydrofera Ready   Secondary dressing: Border dressing 3x3       Left buttock pressure ulcer:   Cleanse wound with: Normal Saline   Lidocaine: PRN   Silver nitrate: PRN   Periwound care: Cavilon, Calmoseptine PRN   Primary dressing: NuShield Graft  Secondary dressing: Border dressing 3x3    Offloading: Roho cushion     Frequency: Mon, Wed, Fri and PRN per Cg.  Cg instructed/demonstrated wound care and verbalized understanding   Follow-up: with Dr. Stone in 2 weeks 03/04/22     Supplies needed for at home wound care 3 x per week:   Saline   Q-tips   Gauze 3x3   Hydrofera Ready   Border dressing 3x3   Promogran  cavilon        Follow up in about 2 weeks (around 3/4/2022).

## 2022-02-18 NOTE — PROCEDURES
"Debridement    Date/Time: 2/18/2022 1:00 PM  Performed by: Ugo Stone MD  Authorized by: Ugo Stone MD     Time out: Immediately prior to procedure a "time out" was called to verify the correct patient, procedure, equipment, support staff and site/side marked as required.    Consent Done?:  Yes (Written)  Local anesthesia used?: Yes    Local anesthetic:  Topical anesthetic    Wound Details:    Location:  Right buttock    Type of Debridement:  Excisional       Length (cm):  1       Area (sq cm):  1       Width (cm):  1       Percent Debrided (%):  100       Depth (cm):  0.1       Total Area Debrided (sq cm):  1    Depth of debridement:  Subcutaneous tissue    Tissue debrided:  Subcutaneous    Devitalized tissue debrided:  Slough, Fibrin and Exudate    Instruments:  Curette    Additional wounds:  1    2nd Wound Details:     Debridement - 2nd Wound - General Location: left buttocks.    Type of Debridement:  Excisional       Length (cm):  0.2       Area (sq cm):  0.08       Width (cm):  0.4       Percent Debrided (%):  100       Depth (cm):  0.9       Total Area Debrided (sq cm):  0.08    Depth of debridement:  Subcutaneous tissue    Tissue debrided:  Subcutaneous    Devitalized tissue debrided:  Slough, Fibrin and Exudate    Instruments:  Curette    Bleeding:  Minimal  Hemostasis Achieved: Yes    Method Used:  Pressure  Patient tolerance:  Patient tolerated the procedure well with no immediate complications    Tissue pathology and/or culture taken : [] Yes [x] No     "

## 2022-03-04 ENCOUNTER — HOSPITAL ENCOUNTER (OUTPATIENT)
Dept: WOUND CARE | Facility: HOSPITAL | Age: 62
Discharge: HOME | End: 2022-03-04
Attending: PREVENTIVE MEDICINE
Payer: MEDICARE

## 2022-03-04 VITALS — TEMPERATURE: 98 F | DIASTOLIC BLOOD PRESSURE: 91 MMHG | HEART RATE: 61 BPM | SYSTOLIC BLOOD PRESSURE: 187 MMHG

## 2022-03-04 DIAGNOSIS — L89.323 STAGE III PRESSURE ULCER OF LEFT BUTTOCK: ICD-10-CM

## 2022-03-04 DIAGNOSIS — L89.313 PRESSURE INJURY OF RIGHT ISCHIUM, STAGE 3: Primary | ICD-10-CM

## 2022-03-04 PROCEDURE — 11042 DBRDMT SUBQ TIS 1ST 20SQCM/<: CPT

## 2022-03-04 PROCEDURE — 15271 SKIN SUB GRAFT TRNK/ARM/LEG: CPT

## 2022-03-04 PROCEDURE — 27201912 HC WOUND CARE DEBRIDEMENT SUPPLIES

## 2022-03-04 RX ORDER — DOXYCYCLINE HYCLATE 100 MG
100 TABLET ORAL EVERY 12 HOURS
Qty: 20 TABLET | Refills: 0 | Status: SHIPPED | OUTPATIENT
Start: 2022-03-04 | End: 2022-03-14

## 2022-03-04 NOTE — PROGRESS NOTES
Subjective:       Patient ID: Derik Fonseca is a 61 y.o. male.    Chief Complaint: Pressure Ulcer (Right and left buttock)    3/4/22: F/U with Dr. Stone. Left buttock debrided per Dr. Stone. [facility] graft #2, 2 x 3cm applied to left buttock site. Donor# [id number]. ID# [id number]. Exp. 07/21/2026. Product # [id number]. No saline used. Right ischial cont. POC. Next visit 2 weeks.    Review of Systems   All other systems reviewed and are negative.        Objective:      Temp:  [98.2 °F (36.8 °C)]   Pulse:  [61]   BP: (187)/(91)   Physical Exam       Altered Skin Integrity 06/04/21 1317 Left Buttocks Ulceration Full thickness tissue loss. Subcutaneous fat may be visible but bone, tendon or muscle are not exposed (Active)   06/04/21 1317   Altered Skin Integrity Present on Admission:    Side: Left   Orientation:    Location: Buttocks   Wound Number:    Is this injury device related?: No   Primary Wound Type: Ulceration   Description of Altered Skin Integrity: Full thickness tissue loss. Subcutaneous fat may be visible but bone, tendon or muscle are not exposed   Removal Indication and Assessment:    Wound Outcome:    (Retired) Wound Length (cm):    (Retired) Wound Width (cm):    (Retired) Depth (cm):    Wound Description (Comments):    Removal Indications:    Dressing Appearance Intact;Moist drainage 03/04/22 1400   Drainage Amount Scant 03/04/22 1400   Drainage Characteristics/Odor Serosanguineous 03/04/22 1400   Appearance Pink;Moist 03/04/22 1400   Tissue loss description Full thickness 03/04/22 1400   Red (%), Wound Tissue Color 100 % 03/04/22 1400   Periwound Area Dry;Intact 03/04/22 1400   Wound Edges Defined 03/04/22 1400   Wound Length (cm) 0.3 cm 03/04/22 1400   Wound Width (cm) 0.3 cm 03/04/22 1400   Wound Depth (cm) 1.2 cm 03/04/22 1400   Wound Volume (cm^3) 0.108 cm^3 03/04/22 1400   Wound Surface Area (cm^2) 0.09 cm^2 03/04/22 1400   Care Cleansed with:;Sterile normal saline;Debrided 03/04/22 1400   Dressing  Applied;Island/border;Other (comment) 03/04/22 1400   Periwound Care Skin barrier film applied 03/04/22 1400   Off Loading Other (see comments) 03/04/22 1400   Dressing Change Due 03/07/22 03/04/22 1400            Wound 12/03/21 1300 Ulceration Right Ischial tuberosity (Active)   12/03/21 1300    Pre-existing: Yes   Primary Wound Type: Ulceration   Side: Right   Orientation:    Location: Ischial tuberosity   Wound Number:    Ankle-Brachial Index:    Pulses:    Removal Indication and Assessment:    Wound Outcome:    (Retired) Wound Type:    (Retired) Wound Length (cm):    (Retired) Wound Width (cm):    (Retired) Depth (cm):    Wound Description (Comments):    Removal Indications:    Dressing Appearance Intact;Dried drainage 03/04/22 1400   Drainage Amount Scant 03/04/22 1400   Drainage Characteristics/Odor Serosanguineous 03/04/22 1400   Appearance Red;Maroon;Moist 03/04/22 1400   Tissue loss description Full thickness 03/04/22 1400   Red (%), Wound Tissue Color 100 % 03/04/22 1400   Periwound Area Dry;Intact 03/04/22 1400   Wound Edges Irregular 03/04/22 1400   Wound Length (cm) 0.6 cm 03/04/22 1400   Wound Width (cm) 0.5 cm 03/04/22 1400   Wound Depth (cm) 0.1 cm 03/04/22 1400   Wound Volume (cm^3) 0.03 cm^3 03/04/22 1400   Wound Surface Area (cm^2) 0.3 cm^2 03/04/22 1400   Care Cleansed with:;Sterile normal saline 03/04/22 1400   Dressing Applied;Hydrofiber;Island/border 03/04/22 1400   Off Loading Other (see comments) 03/04/22 1400   Dressing Change Due 03/07/22 03/04/22 1400       Wound Location: Left buttock  Skin Substitute: Nushielf  Performed By: Ugo Stone  Time-out Taken: Yes  Location:     [] Genitalia/Hands/Feet/Multiple Digits    [] Scalp/Face/Neck/Ears    [x]Trunk/Arms/Legs  Application Area: (sq cm): 1   Product Applied: (sq cm): 6  Product Waste (sq cm): 0  Fenestrated: No   Instrument:    [] Blade [] Curette  []Forceps  []Nippers    [] Rongeur [] Scissors  [x]Others:   Secured with: Steri-strips  and contact layer  Dressing Applied: Yes  Response to Treatment:Well tolerated by patient.      Assessment:         ICD-10-CM ICD-9-CM   1. Pressure injury of right ischium, stage 3  L89.313 707.05     707.23   2. 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  L89.323 707.05     707.23         Plan:   Tissue pathology and/or culture taken:  [] Yes [x] No   Sharp debridement performed:   [x] Yes [] No   Labs ordered this visit:   [] Yes [x] No   Imaging ordered this visit:   [] Yes [x] No           Orders Placed This Encounter   Procedures    Change dressing     Right buttock pressure ulcer:   Cleanse wound with: Normal Saline   Lidocaine: PRN   Silver nitrate: PRN   Periwound care: Cavilon, Calmoseptine PRN   Primary dressing: Hydrofera Ready   Secondary dressing: Border dressing 3x3       Left buttock pressure ulcer:   Cleanse wound with: Normal Saline   Lidocaine: PRN   Silver nitrate: PRN   Periwound care: Cavilon, Calmoseptine PRN   Primary dressing: NuShield Graft   Secondary dressing: Border dressing 3x3     Offloading: Roho cushion     Frequency: Mon, Wed, Fri and PRN per Cg.  Cg instructed/demonstrated wound care and verbalized understanding   Follow-up: with Dr. Stone in 2 weeks 03/18/22    Supplies needed for at home wound care 3 x per week:   Saline   Q-tips   Gauze 3x3   Hydrofera Ready   Border dressing 3x3   Promogran   cavilon        Follow up in about 2 weeks (around 3/18/2022).

## 2022-03-07 NOTE — PROCEDURES
"Debridement    Date/Time: 3/4/2022 1:00 PM  Performed by: Ugo Stone MD  Authorized by: Ugo Stone MD     Time out: Immediately prior to procedure a "time out" was called to verify the correct patient, procedure, equipment, support staff and site/side marked as required.    Consent Done?:  Yes (Written)  Local anesthesia used?: Yes    Local anesthetic:  Topical anesthetic    Wound Details:    Location:  Left buttock       Length (cm):  0.3       Area (sq cm):  0.09       Width (cm):  0.3       Percent Debrided (%):  100       Depth (cm):  1.2       Total Area Debrided (sq cm):  0.09    Depth of debridement:  Subcutaneous tissue    Tissue debrided:  Subcutaneous    Devitalized tissue debrided:  Slough, Fibrin and Exudate    Instruments:  Other    Bleeding:  None  Patient tolerance:  Patient tolerated the procedure well with no immediate complications      "

## 2022-03-18 ENCOUNTER — HOSPITAL ENCOUNTER (OUTPATIENT)
Dept: WOUND CARE | Facility: HOSPITAL | Age: 62
Discharge: HOME | End: 2022-03-18
Attending: PREVENTIVE MEDICINE
Payer: MEDICARE

## 2022-03-18 VITALS
BODY MASS INDEX: 23.62 KG/M2 | TEMPERATURE: 98 F | SYSTOLIC BLOOD PRESSURE: 134 MMHG | HEART RATE: 114 BPM | WEIGHT: 165 LBS | DIASTOLIC BLOOD PRESSURE: 90 MMHG | HEIGHT: 70 IN

## 2022-03-18 DIAGNOSIS — G82.20 PARAPLEGIA: ICD-10-CM

## 2022-03-18 DIAGNOSIS — L89.313 PRESSURE INJURY OF RIGHT ISCHIUM, STAGE 3: Primary | ICD-10-CM

## 2022-03-18 DIAGNOSIS — L89.323 STAGE III PRESSURE ULCER OF LEFT BUTTOCK: ICD-10-CM

## 2022-03-18 PROCEDURE — 15271 SKIN SUB GRAFT TRNK/ARM/LEG: CPT

## 2022-03-18 NOTE — PROGRESS NOTES
Subjective:       Patient ID: Derik Fonseca is a 61 y.o. male.    Chief Complaint: Pressure Ulcer (Right and left buttock)    3/18/22: F/U with Dr. Stone. Nushield graft #4 applied per Dr. Stone today to left ischial site. Donor#[id number]. ID# [id number]. Exp: 11/02/2026. Product: [id number] 2 x 3cm. [facility] [place]# [id number]. Continue Hydrofera ready to right ischial site. Return in 2 weeks.    Review of Systems   All other systems reviewed and are negative.        Objective:      Temp:  [98.1 °F (36.7 °C)]   Pulse:  [114]   BP: (134)/(90)   Physical Exam       Altered Skin Integrity 06/04/21 1317 Left Buttocks Ulceration Full thickness tissue loss. Subcutaneous fat may be visible but bone, tendon or muscle are not exposed (Active)   06/04/21 1317   Altered Skin Integrity Present on Admission:    Side: Left   Orientation:    Location: Buttocks   Wound Number:    Is this injury device related?: No   Primary Wound Type: Ulceration   Description of Altered Skin Integrity: Full thickness tissue loss. Subcutaneous fat may be visible but bone, tendon or muscle are not exposed   Removal Indication and Assessment:    Wound Outcome:    (Retired) Wound Length (cm):    (Retired) Wound Width (cm):    (Retired) Depth (cm):    Wound Description (Comments):    Removal Indications:    Wound Image    03/18/22 1300   Dressing Appearance Intact;Moist drainage 03/18/22 1300   Drainage Amount Small 03/18/22 1300   Drainage Characteristics/Odor Serosanguineous 03/18/22 1300   Appearance Pink;Moist 03/18/22 1300   Tissue loss description Full thickness 03/18/22 1300   Red (%), Wound Tissue Color 100 % 03/18/22 1300   Periwound Area Excoriated 03/18/22 1300   Wound Edges Irregular 03/18/22 1300   Wound Length (cm) 0.3 cm 03/18/22 1300   Wound Width (cm) 0.3 cm 03/18/22 1300   Wound Depth (cm) 1.9 cm 03/18/22 1300   Wound Volume (cm^3) 0.171 cm^3 03/18/22 1300   Wound Surface Area (cm^2) 0.09 cm^2 03/18/22 1300   Care Cleansed with:;Sterile  normal saline 03/18/22 1300   Dressing Applied;Island/border;Other (comment) 03/18/22 1300   Periwound Care Topical treatment applied 03/18/22 1300   Off Loading Other (see comments) 03/18/22 1300   Dressing Change Due 03/21/22 03/18/22 1300            Wound 12/03/21 1300 Ulceration Right Ischial tuberosity (Active)   12/03/21 1300    Pre-existing: Yes   Primary Wound Type: Ulceration   Side: Right   Orientation:    Location: Ischial tuberosity   Wound Number:    Ankle-Brachial Index:    Pulses:    Removal Indication and Assessment:    Wound Outcome:    (Retired) Wound Type:    (Retired) Wound Length (cm):    (Retired) Wound Width (cm):    (Retired) Depth (cm):    Wound Description (Comments):    Removal Indications:    Wound Image    03/18/22 1300   Dressing Appearance Intact;Dried drainage 03/18/22 1300   Drainage Amount Scant 03/18/22 1300   Drainage Characteristics/Odor Serosanguineous 03/18/22 1300   Appearance Red;Moist;Yellow 03/18/22 1300   Tissue loss description Full thickness 03/18/22 1300   Red (%), Wound Tissue Color 50 % 03/18/22 1300   Yellow (%), Wound Tissue Color 50 % 03/18/22 1300   Periwound Area Excoriated 03/18/22 1300   Wound Edges Irregular 03/18/22 1300   Wound Length (cm) 1 cm 03/18/22 1300   Wound Width (cm) 1 cm 03/18/22 1300   Wound Depth (cm) 0.1 cm 03/18/22 1300   Wound Volume (cm^3) 0.1 cm^3 03/18/22 1300   Wound Surface Area (cm^2) 1 cm^2 03/18/22 1300   Care Cleansed with:;Sterile normal saline 03/18/22 1300   Dressing Applied;Hydrofiber;Island/border 03/18/22 1300   Periwound Care Topical treatment applied 03/18/22 1300   Off Loading Other (see comments) 03/18/22 1300   Dressing Change Due 03/21/22 03/18/22 1300       Wound Location: left ischium  Skin Substitute: Nushield  Performed By: Ugo Stone  Time-out Taken: Yes  Location:     [] Genitalia/Hands/Feet/Multiple Digits    [] Scalp/Face/Neck/Ears    [x]Trunk/Arms/Legs  Application Area: (sq cm): 1   Product Applied: (sq cm):  6  Product Waste (sq cm): 0  Fenestrated: No   Instrument:    [] Blade [] Curette  []Forceps  []Nippers    [] Rongeur [] Scissors  []Others:   Secured with: Steri-strips and contact layer  Dressing Applied: Yes  Response to Treatment:Well tolerated by patient.    Assessment:         ICD-10-CM ICD-9-CM   1. Pressure injury of right ischium, stage 3  L89.313 707.05     707.23   2. 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 Paraplegia  G82.20 344.1         Plan:   Tissue pathology and/or culture taken:  [] Yes [x] No   Sharp debridement performed:   [] Yes [x] No   Labs ordered this visit:   [] Yes [x] No   Imaging ordered this visit:   [] Yes [x] No           Orders Placed This Encounter   Procedures    Change dressing     Right buttock pressure ulcer:   Cleanse wound with: Normal Saline   Lidocaine: PRN   Silver nitrate: PRN   Periwound care: Cavilon, Calmoseptine PRN   Primary dressing: Hydrofera Ready   Secondary dressing: Border dressing 3x3       Left buttock pressure ulcer:   Cleanse wound with: Normal Saline   Lidocaine: PRN   Silver nitrate: PRN   Periwound care: Cavilon, Calmoseptine PRN   Primary dressing: NuShield Graft #4 3/18/22  Secondary dressing: Border dressing 3x3     Offloading: Roho cushion     Frequency: Mon, Wed, Fri and PRN per Cg.  Cg instructed/demonstrated wound care and verbalized understanding   Follow-up: with Dr. Stone in 2 weeks 4/1/22    Supplies needed for at home wound care 3 x per week:   Saline   Q-tips   Gauze 3x3   Hydrofera Ready   Border dressing 3x3   Promogran   cavilon        Follow up in about 2 weeks (around 4/1/2022).

## 2022-04-01 ENCOUNTER — HOSPITAL ENCOUNTER (OUTPATIENT)
Dept: WOUND CARE | Facility: HOSPITAL | Age: 62
Discharge: HOME | End: 2022-04-01
Attending: PREVENTIVE MEDICINE
Payer: MEDICARE

## 2022-04-01 VITALS
WEIGHT: 165 LBS | SYSTOLIC BLOOD PRESSURE: 114 MMHG | DIASTOLIC BLOOD PRESSURE: 68 MMHG | BODY MASS INDEX: 23.62 KG/M2 | TEMPERATURE: 98 F | HEIGHT: 70 IN | HEART RATE: 95 BPM

## 2022-04-01 DIAGNOSIS — G82.20 PARAPLEGIA: ICD-10-CM

## 2022-04-01 DIAGNOSIS — L89.313 PRESSURE INJURY OF RIGHT ISCHIUM, STAGE 3: Primary | ICD-10-CM

## 2022-04-01 DIAGNOSIS — L89.323 STAGE III PRESSURE ULCER OF LEFT BUTTOCK: ICD-10-CM

## 2022-04-01 PROCEDURE — 15271 SKIN SUB GRAFT TRNK/ARM/LEG: CPT

## 2022-04-01 NOTE — PROGRESS NOTES
Subjective:       Patient ID: Derik Fonseca is a 61 y.o. male.    Chief Complaint: Pressure Ulcer (Right and left buttock)    4/1/22: F/U for b/l ischial wounds. Nushield #5 applied  to left wound bed. Nushield 2x3 cm. Product code: [id number]. Donor ID [id number]. Exp: 11/29/2026. ID# [id number]. Saline not reguired. Hydrofera ready to right wound bed. Both sites covered with 3 x 3 bordered foam.    Review of Systems   All other systems reviewed and are negative.        Objective:      Temp:  [98.1 °F (36.7 °C)]   Pulse:  [95]   BP: (114)/(68)   Physical Exam       Altered Skin Integrity 06/04/21 1317 Left Buttocks Ulceration Full thickness tissue loss. Subcutaneous fat may be visible but bone, tendon or muscle are not exposed (Active)   06/04/21 1317   Altered Skin Integrity Present on Admission:    Side: Left   Orientation:    Location: Buttocks   Wound Number:    Is this injury device related?: No   Primary Wound Type: Ulceration   Description of Altered Skin Integrity: Full thickness tissue loss. Subcutaneous fat may be visible but bone, tendon or muscle are not exposed   Removal Indication and Assessment:    Wound Outcome:    (Retired) Wound Length (cm):    (Retired) Wound Width (cm):    (Retired) Depth (cm):    Wound Description (Comments):    Removal Indications:    Wound Image   04/01/22 1300   Dressing Appearance Intact;Moist drainage 04/01/22 1300   Drainage Amount Small 04/01/22 1300   Drainage Characteristics/Odor Serosanguineous 04/01/22 1300   Appearance Pink;Moist 04/01/22 1300   Tissue loss description Full thickness 04/01/22 1300   Red (%), Wound Tissue Color 100 % 04/01/22 1300   Periwound Area Excoriated 04/01/22 1300   Wound Edges Irregular 04/01/22 1300   Wound Length (cm) 0.4 cm 04/01/22 1300   Wound Width (cm) 0.6 cm 04/01/22 1300   Wound Depth (cm) 1.1 cm 04/01/22 1300   Wound Volume (cm^3) 0.264 cm^3 04/01/22 1300   Wound Surface Area (cm^2) 0.24 cm^2 04/01/22 1300   Care Cleansed with:;Sterile  normal saline 04/01/22 1300   Dressing Applied;Island/border;Other (comment) 04/01/22 1300   Periwound Care Topical treatment applied;Skin barrier film applied 04/01/22 1300   Off Loading Other (see comments) 04/01/22 1300   Dressing Change Due 04/04/22 04/01/22 1300            Wound 12/03/21 1300 Ulceration Right Ischial tuberosity (Active)   12/03/21 1300    Pre-existing: Yes   Primary Wound Type: Ulceration   Side: Right   Orientation:    Location: Ischial tuberosity   Wound Number:    Ankle-Brachial Index:    Pulses:    Removal Indication and Assessment:    Wound Outcome:    (Retired) Wound Type:    (Retired) Wound Length (cm):    (Retired) Wound Width (cm):    (Retired) Depth (cm):    Wound Description (Comments):    Removal Indications:    Wound Image   04/01/22 1300   Dressing Appearance Intact;Dried drainage 04/01/22 1300   Drainage Amount Small 04/01/22 1300   Drainage Characteristics/Odor Serosanguineous 04/01/22 1300   Appearance Red;Yellow;Moist 04/01/22 1300   Tissue loss description Partial thickness 04/01/22 1300   Red (%), Wound Tissue Color 80 % 04/01/22 1300   Yellow (%), Wound Tissue Color 20 % 04/01/22 1300   Periwound Area Excoriated 04/01/22 1300   Wound Edges Defined 04/01/22 1300   Wound Length (cm) 1 cm 04/01/22 1300   Wound Width (cm) 1 cm 04/01/22 1300   Wound Depth (cm) 0.1 cm 04/01/22 1300   Wound Volume (cm^3) 0.1 cm^3 04/01/22 1300   Wound Surface Area (cm^2) 1 cm^2 04/01/22 1300   Care Cleansed with:;Sterile normal saline 04/01/22 1300   Dressing Applied;Hydrofiber;Island/border 04/01/22 1300   Periwound Care Topical treatment applied;Skin barrier film applied 04/01/22 1300   Off Loading Other (see comments) 04/01/22 1300   Dressing Change Due 04/04/22 04/01/22 1300       Wound Location: Left ishium  Skin Substitute: NuShield  Performed By: Ugo Stone  Time-out Taken: Yes  Location:     [] Genitalia/Hands/Feet/Multiple Digits    []  Scalp/Face/Neck/Ears    [x]Trunk/Arms/Legs  Application Area: (sq cm): .25   Product Applied: (sq cm): 6  Product Waste (sq cm): 0  Fenestrated: No   Instrument:    [] Blade [x] Curette  []Forceps  []Nippers    [] Rongeur [] Scissors  []Others:   Secured with: Steri-strips and contact layer  Dressing Applied: Yes  Response to Treatment:Well tolerated by patient.          Assessment:         ICD-10-CM ICD-9-CM   1. Pressure injury of right ischium, stage 3  L89.313 707.05     707.23   2. 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 Paraplegia  G82.20 344.1         Plan:   Tissue pathology and/or culture taken:  [] Yes [x] No   Sharp debridement performed:   [] Yes [x] No   Labs ordered this visit:   [] Yes [x] No   Imaging ordered this visit:   [] Yes [x] No           Orders Placed This Encounter   Procedures    Change dressing     Right buttock pressure ulcer:   Cleanse wound with: Normal Saline   Lidocaine: PRN   Silver nitrate: PRN   Periwound care: Cavilon, Calmoseptine PRN   Primary dressing: Hydrofera Ready   Secondary dressing: Border dressing 3x3       Left buttock pressure ulcer:   Cleanse wound with: Normal Saline   Lidocaine: PRN   Silver nitrate: PRN   Periwound care: Cavilon, Calmoseptine PRN   Primary dressing: NuShield Graft #5 4/1/22  Secondary dressing: Border dressing 3x3     Offloading: Roho cushion     Frequency: Mon, Wed, Fri and PRN per Cg.  Cg instructed/demonstrated wound care and verbalized understanding   Follow-up: with Dr. Stone 4/22/22    Supplies needed for at home wound care 3 x per week:   Saline   Q-tips   Gauze 3x3   Hydrofera Ready   Border dressing 3x3   Promogran   cavilon        No follow-ups on file.

## 2022-04-04 NOTE — PROCEDURES
"Debridement    Date/Time: 4/1/2022 1:00 PM  Performed by: Ugo Stone MD  Authorized by: Ugo Stone MD     Time out: Immediately prior to procedure a "time out" was called to verify the correct patient, procedure, equipment, support staff and site/side marked as required.    Consent Done?:  Yes (Written)  Local anesthesia used?: Yes    Local anesthetic:  Topical anesthetic    Wound Details:    Location:  Right buttock       Length (cm):  1       Area (sq cm):  1       Width (cm):  1       Percent Debrided (%):  100       Depth (cm):  0.1       Total Area Debrided (sq cm):  1    Depth of debridement:  Subcutaneous tissue    Tissue debrided:  Subcutaneous    Devitalized tissue debrided:  Slough, Exudate and Fibrin    Instruments:  Curette    Bleeding:  Minimal  Hemostasis Achieved: Yes    Method Used:  Pressure  Patient tolerance:  Patient tolerated the procedure well with no immediate complications      "

## 2022-04-22 ENCOUNTER — HOSPITAL ENCOUNTER (OUTPATIENT)
Dept: WOUND CARE | Facility: HOSPITAL | Age: 62
Discharge: HOME | End: 2022-04-22
Attending: PREVENTIVE MEDICINE
Payer: MEDICARE

## 2022-04-22 VITALS
TEMPERATURE: 98 F | HEART RATE: 105 BPM | BODY MASS INDEX: 23.62 KG/M2 | SYSTOLIC BLOOD PRESSURE: 137 MMHG | WEIGHT: 165 LBS | HEIGHT: 70 IN | DIASTOLIC BLOOD PRESSURE: 52 MMHG

## 2022-04-22 DIAGNOSIS — L89.312: ICD-10-CM

## 2022-04-22 DIAGNOSIS — G82.20 PARAPLEGIA: ICD-10-CM

## 2022-04-22 DIAGNOSIS — L89.313 PRESSURE INJURY OF RIGHT ISCHIUM, STAGE 3: Primary | ICD-10-CM

## 2022-04-22 DIAGNOSIS — Z98.890 S/P FLAP GRAFT: ICD-10-CM

## 2022-04-22 PROCEDURE — 15275 SKIN SUB GRAFT FACE/NK/HF/G: CPT

## 2022-04-22 PROCEDURE — 15271 SKIN SUB GRAFT TRNK/ARM/LEG: CPT

## 2022-04-22 NOTE — PROGRESS NOTES
Subjective:       Patient ID: Derik Fonseca is a 61 y.o. male.    Chief Complaint: Wound Care    Patient to wound care center with no new problems or complaints. NuShield #6 applied.  NuShield [id number] Exp date 01/02/2027. Saline [id number], exp date02/01/2024    Review of Systems      Objective:      Temp:  [98.1 °F (36.7 °C)]   Pulse:  [105]   BP: (137)/(52)   Physical Exam         Assessment:         ICD-10-CM ICD-9-CM   1. Decubitus ulcer of ischial area  L89.309 707.05     707.20   2. 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  L89.323 707.05     707.23         Plan:   Tissue pathology and/or culture taken:  [] Yes [x] No   Sharp debridement performed:   [] Yes [x] No   Labs ordered this visit:   [] Yes [x] No   Imaging ordered this visit:   [] Yes [x] No           Orders Placed This Encounter   Procedures    Change dressing     Right buttock pressure ulcer:   Cleanse wound with: Normal Saline   Lidocaine: PRN   Silver nitrate: PRN   Periwound care: Cavilon, Calmoseptine PRN   Primary dressing: Hydrofera Ready   Secondary dressing: Border dressing 3x3       Left buttock pressure ulcer:   Cleanse wound with: Normal Saline   Lidocaine: PRN   Silver nitrate: PRN   Periwound care: Cavilon, Calmoseptine PRN   Primary dressing: NuShield Graft #6 4/22/22   Secondary dressing: Border dressing 3x3     Offloading: Roho cushion     Frequency: Mon, Wed, Fri and PRN per Cg.  Cg instructed/demonstrated wound care and verbalized understanding   Follow-up: with Dr. Stnoe 05/06/22     Supplies needed for at home wound care 3 x per week:   Saline   Q-tips   Gauze 3x3   Hydrofera Ready   Border dressing 3x3   Promogran   cavilon        Follow up in about 2 weeks (around 5/6/2022).

## 2022-04-26 NOTE — PROGRESS NOTES
Subjective:       Patient ID: Derik Fonseca is a 61 y.o. male.    Chief Complaint: Wound Care    Patient to wound care center with no new problems or complaints. [facility] #6 applied.  [facility] [id number], [facility] [place]#[id number], [id number]  Exp date 01/02/2027. Saline [id number], exp date02/01/2024      Review of Systems   All other systems reviewed and are negative.        Objective:         Physical Exam       Altered Skin Integrity 06/04/21 1317 Left Buttocks Ulceration Full thickness tissue loss. Subcutaneous fat may be visible but bone, tendon or muscle are not exposed (Active)   06/04/21 1317   Altered Skin Integrity Present on Admission:    Side: Left   Orientation:    Location: Buttocks   Wound Number:    Is this injury device related?: No   Primary Wound Type: Ulceration   Description of Altered Skin Integrity: Full thickness tissue loss. Subcutaneous fat may be visible but bone, tendon or muscle are not exposed   Removal Indication and Assessment:    Wound Outcome:    (Retired) Wound Length (cm):    (Retired) Wound Width (cm):    (Retired) Depth (cm):    Wound Description (Comments):    Removal Indications:    Wound Image   04/22/22 1306   Dressing Appearance Intact;Moist drainage 04/22/22 1306   Drainage Amount Small 04/22/22 1306   Drainage Characteristics/Odor Serosanguineous 04/22/22 1306   Appearance Intact;Pink;Moist 04/22/22 1306   Tissue loss description Full thickness 04/22/22 1306   Red (%), Wound Tissue Color 100 % 04/22/22 1306   Periwound Area Intact;Excoriated 04/22/22 1306   Wound Edges Irregular 04/22/22 1306   Wound Length (cm) 0.4 cm 04/22/22 1306   Wound Width (cm) 0.5 cm 04/22/22 1306   Wound Depth (cm) 1.2 cm 04/22/22 1306   Wound Volume (cm^3) 0.24 cm^3 04/22/22 1306   Wound Surface Area (cm^2) 0.2 cm^2 04/22/22 1306   Care Cleansed with:;Sterile normal saline 04/22/22 1306   Dressing Applied;Changed;Island/border 04/22/22 1306   Periwound Care Skin barrier film applied 04/22/22 1306   Off  Loading Other (see comments) 04/22/22 1306   Dressing Change Due 04/28/22 04/22/22 1306            Wound 12/03/21 1300 Ulceration Right Ischial tuberosity (Active)   12/03/21 1300    Pre-existing: Yes   Primary Wound Type: Ulceration   Side: Right   Orientation:    Location: Ischial tuberosity   Wound Number:    Ankle-Brachial Index:    Pulses:    Removal Indication and Assessment:    Wound Outcome:    (Retired) Wound Type:    (Retired) Wound Length (cm):    (Retired) Wound Width (cm):    (Retired) Depth (cm):    Wound Description (Comments):    Removal Indications:    Wound Image   04/22/22 1306   Dressing Appearance Intact;Dried drainage 04/22/22 1306   Drainage Amount Small 04/22/22 1306   Drainage Characteristics/Odor Serosanguineous 04/22/22 1306   Appearance Intact 04/22/22 1306   Tissue loss description Partial thickness 04/22/22 1306   Red (%), Wound Tissue Color 90 % 04/22/22 1306   Yellow (%), Wound Tissue Color 10 % 04/22/22 1306   Periwound Area Intact;Excoriated 04/22/22 1306   Wound Edges Defined 04/22/22 1306   Wound Length (cm) 1 cm 04/22/22 1306   Wound Width (cm) 1 cm 04/22/22 1306   Wound Depth (cm) 0.1 cm 04/22/22 1306   Wound Volume (cm^3) 0.1 cm^3 04/22/22 1306   Wound Surface Area (cm^2) 1 cm^2 04/22/22 1306   Care Cleansed with:;Sterile normal saline 04/22/22 1306   Dressing Applied;Changed;Hydrofiber;Island/border 04/22/22 1306   Periwound Care Skin barrier film applied 04/22/22 1306   Off Loading Other (see comments) 04/22/22 1306   Dressing Change Due 04/28/22 04/22/22 1306       Wound Location: left buttock  Skin Substitute: nushield  Performed By: Ugo Stone  Time-out Taken: Yes  Location:     [] Genitalia/Hands/Feet/Multiple Digits    [] Scalp/Face/Neck/Ears    [x]Trunk/Arms/Legs  Application Area: (sq cm): 0.24   Product Applied: (sq cm): 6  Product Waste (sq cm): 0  Fenestrated: No   Instrument:    [] Blade [x] Curette  []Forceps  []Nippers    [] Rongeur [] Scissors  []Others:    Secured with: Steri-strips and contact layer  Dressing Applied: Yes  Response to Treatment:Well tolerated by patient.          Assessment:         ICD-10-CM ICD-9-CM   1. Pressure injury of right ischium, stage 3  L89.313 707.05     707.23   2. Decubitus ulcer of right ischial area, stage II  L89.312 707.05     707.22   3. Paraplegia  G82.20 344.1   4. S/P flap graft  Z98.890 V45.89         Plan:   Tissue pathology and/or culture taken:  [] Yes [x] No   Sharp debridement performed:   [] Yes [x] No   Labs ordered this visit:   [] Yes [x] No   Imaging ordered this visit:   [] Yes [x] No           Orders Placed This Encounter   Procedures    Change dressing     Right buttock pressure ulcer:   Cleanse wound with: Normal Saline   Lidocaine: PRN   Silver nitrate: PRN   Periwound care: Cavilon, Calmoseptine PRN   Primary dressing: Hydrofera Ready   Secondary dressing: Border dressing 3x3       Left buttock pressure ulcer:   Cleanse wound with: Normal Saline   Lidocaine: PRN   Silver nitrate: PRN   Periwound care: Cavilon, Calmoseptine PRN   Primary dressing: NuShield Graft #6 4/22/22   Secondary dressing: Border dressing 3x3     Offloading: Roho cushion     Frequency: Mon, Wed, Fri and PRN per Cg.  Cg instructed/demonstrated wound care and verbalized understanding   Follow-up: with Dr. Stone 05/06/22     Supplies needed for at home wound care 3 x per week:   Saline   Q-tips   Gauze 3x3   Hydrofera Ready   Border dressing 3x3   Promogran   cavilon        Follow up in about 2 weeks (around 5/6/2022).

## 2022-05-06 ENCOUNTER — HOSPITAL ENCOUNTER (OUTPATIENT)
Dept: WOUND CARE | Facility: HOSPITAL | Age: 62
Discharge: HOME | End: 2022-05-06
Attending: PREVENTIVE MEDICINE
Payer: MEDICARE

## 2022-05-06 VITALS
WEIGHT: 165 LBS | SYSTOLIC BLOOD PRESSURE: 136 MMHG | DIASTOLIC BLOOD PRESSURE: 66 MMHG | HEIGHT: 70 IN | HEART RATE: 99 BPM | TEMPERATURE: 98 F | BODY MASS INDEX: 23.62 KG/M2

## 2022-05-06 DIAGNOSIS — L89.313 PRESSURE INJURY OF RIGHT ISCHIUM, STAGE 3: Primary | ICD-10-CM

## 2022-05-06 DIAGNOSIS — L89.323 STAGE III PRESSURE ULCER OF LEFT BUTTOCK: ICD-10-CM

## 2022-05-06 PROCEDURE — 11042 DBRDMT SUBQ TIS 1ST 20SQCM/<: CPT

## 2022-05-06 PROCEDURE — 15271 SKIN SUB GRAFT TRNK/ARM/LEG: CPT

## 2022-05-06 PROCEDURE — 27201912 HC WOUND CARE DEBRIDEMENT SUPPLIES

## 2022-05-06 NOTE — PROGRESS NOTES
Subjective:       Patient ID: Derik Fonseca is a 61 y.o. male.    Chief Complaint: Pressure Ulcer (Right and left buttocks)    5/6/22: F/U with dr. Stone Both sites debrided. Nushield #7 2 x 3 cm graft  applied to left wound bed per Dr. Stone: [facility] # [id number], Donor # [id number], ID # [id number], Exp. 01/22/2027, Product# [id number]. No saline required. Continue POC to right buttock site.     Review of Systems   All other systems reviewed and are negative.        Objective:      Temp:  [98.1 °F (36.7 °C)]   Pulse:  [99]   BP: (136)/(66)   Physical Exam       Altered Skin Integrity 06/04/21 1317 Left Buttocks Ulceration Full thickness tissue loss. Subcutaneous fat may be visible but bone, tendon or muscle are not exposed (Active)   06/04/21 1317   Altered Skin Integrity Present on Admission:    Side: Left   Orientation:    Location: Buttocks   Wound Number:    Is this injury device related?: No   Primary Wound Type: Ulceration   Description of Altered Skin Integrity: Full thickness tissue loss. Subcutaneous fat may be visible but bone, tendon or muscle are not exposed   Removal Indication and Assessment:    Wound Outcome:    (Retired) Wound Length (cm):    (Retired) Wound Width (cm):    (Retired) Depth (cm):    Wound Description (Comments):    Removal Indications:    Wound Image   05/06/22 1300   Dressing Appearance Moist drainage 05/06/22 1300   Drainage Amount Small 05/06/22 1300   Drainage Characteristics/Odor Serosanguineous 05/06/22 1300   Appearance Pink;Moist 05/06/22 1300   Tissue loss description Full thickness 05/06/22 1300   Red (%), Wound Tissue Color 100 % 05/06/22 1300   Periwound Area Dry 05/06/22 1300   Wound Edges Irregular 05/06/22 1300   Wound Length (cm) 0.5 cm 05/06/22 1300   Wound Width (cm) 0.2 cm 05/06/22 1300   Wound Depth (cm) 0.9 cm 05/06/22 1300   Wound Volume (cm^3) 0.09 cm^3 05/06/22 1300   Wound Surface Area (cm^2) 0.1 cm^2 05/06/22 1300   Care Cleansed with:;Sterile normal  saline;Debrided 05/06/22 1300   Dressing Applied;Island/border;Other (comment) 05/06/22 1300   Periwound Care Skin barrier film applied 05/06/22 1300   Off Loading Other (see comments) 05/06/22 1300   Dressing Change Due 05/09/22 05/06/22 1300            Wound 12/03/21 1300 Ulceration Right Ischial tuberosity (Active)   12/03/21 1300    Pre-existing: Yes   Primary Wound Type: Ulceration   Side: Right   Orientation:    Location: Ischial tuberosity   Wound Number:    Ankle-Brachial Index:    Pulses:    Removal Indication and Assessment:    Wound Outcome:    (Retired) Wound Type:    (Retired) Wound Length (cm):    (Retired) Wound Width (cm):    (Retired) Depth (cm):    Wound Description (Comments):    Removal Indications:    Wound Image   05/06/22 1300   Dressing Appearance Moist drainage 05/06/22 1300   Drainage Amount Small 05/06/22 1300   Drainage Characteristics/Odor Serosanguineous 05/06/22 1300   Appearance Black;Yellow;Moist 05/06/22 1300   Tissue loss description Full thickness 05/06/22 1300   Black (%), Wound Tissue Color 70 % 05/06/22 1300   Yellow (%), Wound Tissue Color 30 % 05/06/22 1300   Periwound Area Scar tissue;Dry;Bernice 05/06/22 1300   Wound Edges Defined 05/06/22 1300   Wound Length (cm) 1.5 cm 05/06/22 1300   Wound Width (cm) 1 cm 05/06/22 1300   Wound Depth (cm) 0.1 cm 05/06/22 1300   Wound Volume (cm^3) 0.15 cm^3 05/06/22 1300   Wound Surface Area (cm^2) 1.5 cm^2 05/06/22 1300   Care Cleansed with:;Sterile normal saline;Debrided 05/06/22 1300   Dressing Applied;Hydrofiber;Island/border 05/06/22 1300   Periwound Care Skin barrier film applied 05/06/22 1300   Off Loading Other (see comments) 05/06/22 1300   Dressing Change Due 05/09/22 05/06/22 1300       Wound Location: Left Buttokcs  Skin Substitute: Nushield  Performed By: Ugo Stone  Time-out Taken: Yes  Location:     [] Genitalia/Hands/Feet/Multiple Digits    [] Scalp/Face/Neck/Ears    [x]Trunk/Arms/Legs  Application Area: (sq cm): 1.5    Product Applied: (sq cm): 6  Product Waste (sq cm): 0  Fenestrated: No   Instrument:    [] Blade [x] Curette  []Forceps  []Nippers    [] Rongeur [] Scissors  []Others:   Secured with: Steri-strips and contact layer  Dressing Applied: Yes  Response to Treatment:Well tolerated by patient.        Assessment:         ICD-10-CM ICD-9-CM   1. Pressure injury of right ischium, stage 3  L89.313 707.05     707.23   2. 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  L89.323 707.05     707.23         Plan:   Tissue pathology and/or culture taken:  [] Yes [x] No   Sharp debridement performed:   [x] Yes [] No   Labs ordered this visit:   [] Yes [x] No   Imaging ordered this visit:   [] Yes  [x] No           Orders Placed This Encounter   Procedures    Change dressing     Right buttock pressure ulcer:   Cleanse wound with: Normal Saline   Lidocaine: PRN   Silver nitrate: PRN   Periwound care: Cavilon, Calmoseptine PRN   Primary dressing: Hydrofera Ready   Secondary dressing: Border dressing 3x3       Left buttock pressure ulcer:   Cleanse wound with: Normal Saline   Lidocaine: PRN   Silver nitrate: PRN   Periwound care: Cavilon, Calmoseptine PRN   Primary dressing: NuShield Graft #7 5/6/22  Secondary dressing: Border dressing 3x3     Offloading: Roho cushion     Frequency: Mon, Wed, Fri and PRN per Cg.  Cg instructed/demonstrated wound care and verbalized understanding   Follow-up: with Dr. Stone 05/20/22    Supplies needed for at home wound care 3 x per week:   Saline   Q-tips   Gauze 3x3   Hydrofera Ready   Border dressing 3x3   Promogran   cavilon        Follow up in about 2 weeks (around 5/20/2022).

## 2022-05-09 NOTE — PROCEDURES
Debridement    Date/Time: 5/6/2022 1:00 PM  Performed by: Ugo Stone MD  Authorized by: Ugo Stone MD     Consent Done?:  Yes (Written)    Preparation: Patient was prepped and draped in usual sterile fashion    Local anesthesia used?: Yes    Local anesthetic:  Topical anesthetic    Wound Details:    Location:  Right buttock    Type of Debridement:  Excisional       Length (cm):  1.5       Area (sq cm):  1.5       Width (cm):  1       Percent Debrided (%):  100       Depth (cm):  0.1       Total Area Debrided (sq cm):  1.5    Depth of debridement:  Subcutaneous tissue    Tissue debrided:  Subcutaneous    Devitalized tissue debrided:  Slough, Necrotic/Eschar, Fibrin and Exudate    Instruments:  Curette    Bleeding:  Minimal  Hemostasis Achieved: Yes    Method Used:  Pressure  Patient tolerance:  Patient tolerated the procedure well with no immediate complications

## 2022-05-20 ENCOUNTER — HOSPITAL ENCOUNTER (OUTPATIENT)
Dept: WOUND CARE | Facility: HOSPITAL | Age: 62
Discharge: HOME | End: 2022-05-20
Attending: PREVENTIVE MEDICINE
Payer: MEDICARE

## 2022-05-20 VITALS — WEIGHT: 165 LBS | BODY MASS INDEX: 23.62 KG/M2 | TEMPERATURE: 99 F | HEIGHT: 70 IN

## 2022-05-20 DIAGNOSIS — G82.20 PARAPLEGIA: ICD-10-CM

## 2022-05-20 DIAGNOSIS — Z98.890 S/P FLAP GRAFT: ICD-10-CM

## 2022-05-20 DIAGNOSIS — L89.309: ICD-10-CM

## 2022-05-20 DIAGNOSIS — L89.323 STAGE III PRESSURE ULCER OF LEFT BUTTOCK: Primary | ICD-10-CM

## 2022-05-20 DIAGNOSIS — L89.312: ICD-10-CM

## 2022-05-20 PROCEDURE — 11042 DBRDMT SUBQ TIS 1ST 20SQCM/<: CPT

## 2022-05-20 PROCEDURE — 11043 DBRDMT MUSC&/FSCA 1ST 20/<: CPT

## 2022-05-20 PROCEDURE — 27201912 HC WOUND CARE DEBRIDEMENT SUPPLIES

## 2022-05-20 NOTE — PROGRESS NOTES
Subjective:       Patient ID: Derik Fonseca is a 61 y.o. male.    Chief Complaint: Wound Care    Patient to wound care center with no new problems or complaints.   Continue with the same treatment plan.     Review of Systems   All other systems reviewed and are negative.        Objective:        Physical Exam       Altered Skin Integrity 06/04/21 1317 Left Buttocks Ulceration Full thickness tissue loss. Subcutaneous fat may be visible but bone, tendon or muscle are not exposed (Active)   06/04/21 1317   Altered Skin Integrity Present on Admission:    Side: Left   Orientation:    Location: Buttocks   Wound Number:    Is this injury device related?: No   Primary Wound Type: Ulceration   Description of Altered Skin Integrity: Full thickness tissue loss. Subcutaneous fat may be visible but bone, tendon or muscle are not exposed   Removal Indication and Assessment:    Wound Outcome:    (Retired) Wound Length (cm):    (Retired) Wound Width (cm):    (Retired) Depth (cm):    Wound Description (Comments):    Removal Indications:    Wound Image   05/20/22 1500   Dressing Appearance Moist drainage 05/20/22 1500   Drainage Amount Small 05/20/22 1500   Drainage Characteristics/Odor Serosanguineous 05/20/22 1500   Appearance Intact;Moist 05/20/22 1500   Tissue loss description Full thickness 05/20/22 1500   Red (%), Wound Tissue Color 100 % 05/20/22 1500   Periwound Area Intact;Dry 05/20/22 1500   Wound Edges Irregular 05/20/22 1500   Wound Length (cm) 0.3 cm 05/20/22 1500   Wound Width (cm) 0.3 cm 05/20/22 1500   Wound Depth (cm) 0.8 cm 05/20/22 1500   Wound Volume (cm^3) 0.072 cm^3 05/20/22 1500   Wound Surface Area (cm^2) 0.09 cm^2 05/20/22 1500   Undermining (depth (cm)/location) 1.6 @4 05/20/22 1500   Care Cleansed with:;Sterile normal saline 05/20/22 1500   Dressing Applied;Changed;Collagen;Silver;Island/border 05/20/22 1500   Periwound Care Skin barrier film applied 05/20/22 1500   Off Loading Other (see comments)  05/20/22 1500   Dressing Change Due 06/03/22 05/20/22 1500            Wound 12/03/21 1300 Ulceration Right Ischial tuberosity (Active)   12/03/21 1300    Pre-existing: Yes   Primary Wound Type: Ulceration   Side: Right   Orientation:    Location: Ischial tuberosity   Wound Number:    Ankle-Brachial Index:    Pulses:    Removal Indication and Assessment:    Wound Outcome:    (Retired) Wound Type:    (Retired) Wound Length (cm):    (Retired) Wound Width (cm):    (Retired) Depth (cm):    Wound Description (Comments):    Removal Indications:    Wound Image   05/20/22 1500   Dressing Appearance Intact;Moist drainage 05/20/22 1500   Drainage Amount Small 05/20/22 1500   Drainage Characteristics/Odor Serosanguineous 05/20/22 1500   Appearance Intact;Red;Yellow 05/20/22 1500   Tissue loss description Full thickness 05/20/22 1500   Red (%), Wound Tissue Color 20 % 05/20/22 1500   Yellow (%), Wound Tissue Color 80 % 05/20/22 1500   Wound Edges Defined 05/20/22 1500   Wound Length (cm) 1.4 cm 05/20/22 1500   Wound Width (cm) 1.5 cm 05/20/22 1500   Wound Depth (cm) 0.1 cm 05/20/22 1500   Wound Volume (cm^3) 0.21 cm^3 05/20/22 1500   Wound Surface Area (cm^2) 2.1 cm^2 05/20/22 1500   Care Cleansed with:;Sterile normal saline 05/20/22 1500   Dressing Applied;Changed;Collagen;Silver;Hydrofiber;Island/border 05/20/22 1500   Periwound Care Skin barrier film applied 05/20/22 1500   Dressing Change Due 06/03/22 05/20/22 1500         Assessment:         ICD-10-CM ICD-9-CM   1. 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  L89.323 707.05     707.23   2. Pressure ulcer of ischium, stage 2, right  L89.312 707.05     707.22   3. Paraplegia  G82.20 344.1   4. S/P flap graft  Z98.890 V45.89   5. Decubitus ulcer of ischial area  L89.309 707.05     707.20         Plan:   Tissue pathology and/or culture taken:  [] Yes [x] No   Sharp debridement performed:   [x] Yes [] No   Labs ordered this visit:   [] Yes [] No   Imaging ordered this visit:   []  Yes [x] No           Orders Placed This Encounter   Procedures    Change dressing     Right buttock pressure ulcer:   Cleanse wound with: Normal Saline   Lidocaine: PRN   Silver nitrate: PRN   Periwound care: Cavilon, Calmoseptine PRN   Primary dressing: Gisele, Hydrofera Ready   Secondary dressing: Border dressing 3x3       Left buttock pressure ulcer:   Cleanse wound with: Normal Saline   Lidocaine: PRN   Silver nitrate: PRN   Periwound care: Cavilon, Calmoseptine PRN   Primary dressing: ([facility] Graft #7 5/6/22) Gisele   Secondary dressing: Border dressing 3x3     Offloading: Roho cushion     Frequency: Mon, Wed, Fri and PRN per Cg.  Cg instructed/demonstrated wound care and verbalized understanding   Follow-up: with Dr. Stone 06/03/22    Supplies needed for at home wound care 3 x per week:   Saline   Q-tips   Gauze 3x3   Hydrofera Ready   Border dressing 3x3   Promogran   cavilon        Follow up in about 2 weeks (around 6/3/2022).

## 2022-05-30 NOTE — PROCEDURES
"Debridement    Date/Time: 5/20/2022 2:00 PM  Performed by: Ugo Stone MD  Authorized by: Ugo Stone MD     Time out: Immediately prior to procedure a "time out" was called to verify the correct patient, procedure, equipment, support staff and site/side marked as required.    Consent Done?:  Yes (Written)  Local anesthesia used?: Yes    Local anesthetic:  Topical anesthetic    Wound Details:    Location:  Left buttock    Type of Debridement:  Excisional       Length (cm):  0.3       Area (sq cm):  0.09       Width (cm):  0.3       Percent Debrided (%):  100       Depth (cm):  0.8       Total Area Debrided (sq cm):  0.09    Depth of debridement:  Muscle/fascia/tendon    Tissue debrided:  Subcutaneous and Muscle    Devitalized tissue debrided:  Slough, Fibrin, Exudate and Callus    Instruments:  Curette    Additional wounds:  1    2nd Wound Details:     Debridement - 2nd Wound - General Location: right buttocks.    Type of Debridement:  Excisional       Length (cm):  1.4       Area (sq cm):  2.1       Width (cm):  1.5       Percent Debrided (%):  100       Depth (cm):  0.1       Total Area Debrided (sq cm):  2.1    Depth of debridement:  Subcutaneous tissue    Tissue debrided:  Subcutaneous    Devitalized tissue debrided:  Slough, Necrotic/Eschar, Fibrin and Exudate    Instruments:  Curette    Bleeding:  Minimal  Hemostasis Achieved: Yes    Method Used:  Pressure  Patient tolerance:  Patient tolerated the procedure well with no immediate complications      "

## 2022-06-10 ENCOUNTER — HOSPITAL ENCOUNTER (OUTPATIENT)
Dept: WOUND CARE | Facility: HOSPITAL | Age: 62
Discharge: HOME | End: 2022-06-10
Attending: PREVENTIVE MEDICINE
Payer: MEDICARE

## 2022-06-10 VITALS
DIASTOLIC BLOOD PRESSURE: 56 MMHG | SYSTOLIC BLOOD PRESSURE: 92 MMHG | BODY MASS INDEX: 23.62 KG/M2 | HEIGHT: 70 IN | HEART RATE: 91 BPM | WEIGHT: 165 LBS | TEMPERATURE: 98 F

## 2022-06-10 DIAGNOSIS — L89.323 STAGE III PRESSURE ULCER OF LEFT BUTTOCK: Primary | ICD-10-CM

## 2022-06-10 DIAGNOSIS — G82.20 PARAPLEGIA: ICD-10-CM

## 2022-06-10 DIAGNOSIS — L89.312: ICD-10-CM

## 2022-06-10 PROCEDURE — 15271 SKIN SUB GRAFT TRNK/ARM/LEG: CPT

## 2022-06-10 PROCEDURE — 11042 DBRDMT SUBQ TIS 1ST 20SQCM/<: CPT

## 2022-06-10 NOTE — PROGRESS NOTES
[facility]    Subjective:       Patient ID: Derik Fonseca is a 61 y.o. male.    Chief Complaint: Wound Care     New wound present to R lateral knee.  No complaints. Wounds are getting better. [facility] Graft #8 no saline packed into L buttock [facility] [phone], graft number 8, donor number [id number], Donor ID [id number], expires 3/15/2027.      Review of Systems   All other systems reviewed and are negative.        Objective:        Physical Exam       Altered Skin Integrity 06/04/21 1317 Left Buttocks Ulceration Full thickness tissue loss. Subcutaneous fat may be visible but bone, tendon or muscle are not exposed (Active)   06/04/21 1317   Altered Skin Integrity Present on Admission:    Side: Left   Orientation:    Location: Buttocks   Wound Number:    Is this injury device related?: No   Primary Wound Type: Ulceration   Description of Altered Skin Integrity: Full thickness tissue loss. Subcutaneous fat may be visible but bone, tendon or muscle are not exposed   Removal Indication and Assessment:    Wound Outcome:    (Retired) Wound Length (cm):    (Retired) Wound Width (cm):    (Retired) Depth (cm):    Wound Description (Comments):    Removal Indications:    Wound Image   06/10/22 1300   Dressing Appearance Intact;Moist drainage 06/10/22 1300   Drainage Amount Scant 06/10/22 1300   Drainage Characteristics/Odor Serosanguineous 06/10/22 1300   Appearance Pink;Moist 06/10/22 1300   Tissue loss description Full thickness 06/10/22 1300   Red (%), Wound Tissue Color 100 % 06/10/22 1300   Periwound Area Intact;Dry;Pale white;Pink;Excoriated 06/10/22 1300   Wound Edges Jagged 06/10/22 1300   Wound Length (cm) 0.4 cm 06/10/22 1300   Wound Width (cm) 0.3 cm 06/10/22 1300   Wound Depth (cm) 0.9 cm 06/10/22 1300   Wound Volume (cm^3) 0.108 cm^3 06/10/22 1300   Wound Surface Area (cm^2) 0.12 cm^2 06/10/22 1300   Care Cleansed with:;Sterile normal saline;Debrided 06/10/22 1300    Dressing Applied;Other (comment);Foam;Island/border 06/10/22 1300   Periwound Care Moisture barrier applied;Skin barrier film applied 06/10/22 1300   Dressing Change Due 06/13/22 06/10/22 1300            Wound 12/03/21 1300 Ulceration Right Ischial tuberosity (Active)   12/03/21 1300    Pre-existing: Yes   Primary Wound Type: Ulceration   Side: Right   Orientation:    Location: Ischial tuberosity   Wound Number:    Ankle-Brachial Index:    Pulses:    Removal Indication and Assessment:    Wound Outcome:    (Retired) Wound Type:    (Retired) Wound Length (cm):    (Retired) Wound Width (cm):    (Retired) Depth (cm):    Wound Description (Comments):    Removal Indications:    Wound Image   06/10/22 1300   Dressing Appearance Intact;Moist drainage 06/10/22 1300   Drainage Amount Scant 06/10/22 1300   Drainage Characteristics/Odor Serosanguineous 06/10/22 1300   Appearance Pink;Red;Yellow;Moist 06/10/22 1300   Red (%), Wound Tissue Color 30 % 06/10/22 1300   Yellow (%), Wound Tissue Color 70 % 06/10/22 1300   Periwound Area Intact;Dry;Excoriated 06/10/22 1300   Wound Edges Open;Defined 06/10/22 1300   Wound Length (cm) 1 cm 06/10/22 1300   Wound Width (cm) 0.8 cm 06/10/22 1300   Wound Depth (cm) 0.1 cm 06/10/22 1300   Wound Volume (cm^3) 0.08 cm^3 06/10/22 1300   Wound Surface Area (cm^2) 0.8 cm^2 06/10/22 1300   Care Cleansed with:;Sterile normal saline;Debrided 06/10/22 1300   Dressing Applied;Hydrofiber;Collagen;Foam;Island/border 06/10/22 1300   Periwound Care Moisture barrier applied;Skin barrier film applied 06/10/22 1300   Dressing Change Due 06/13/22 06/10/22 1300            Wound 06/10/22 1300 Right anterior;lateral Knee (Active)   06/10/22 1300    Pre-existing: Yes   Primary Wound Type:    Side: Right   Orientation: anterior;lateral   Location: Knee   Wound Number:    Ankle-Brachial Index:    Pulses:    Removal Indication and Assessment:    Wound Outcome:    (Retired) Wound Type:    (Retired) Wound Length  (cm):    (Retired) Wound Width (cm):    (Retired) Depth (cm):    Wound Description (Comments):    Removal Indications:    Wound Image   06/10/22 1300   Dressing Appearance Intact;Moist drainage 06/10/22 1300   Drainage Amount Scant 06/10/22 1300   Drainage Characteristics/Odor Serosanguineous 06/10/22 1300   Appearance Pink;Red;Moist;Yellow 06/10/22 1300   Tissue loss description Partial thickness 06/10/22 1300   Red (%), Wound Tissue Color 80 % 06/10/22 1300   Yellow (%), Wound Tissue Color 20 % 06/10/22 1300   Periwound Area Intact;Dry 06/10/22 1300   Wound Edges Open 06/10/22 1300   Wound Length (cm) 0.6 cm 06/10/22 1300   Wound Width (cm) 0.5 cm 06/10/22 1300   Wound Depth (cm) 0.1 cm 06/10/22 1300   Wound Volume (cm^3) 0.03 cm^3 06/10/22 1300   Wound Surface Area (cm^2) 0.3 cm^2 06/10/22 1300   Care Cleansed with:;Sterile normal saline 06/10/22 1300   Dressing Applied;Hydrofiber;Foam;Island/border 06/10/22 1300   Periwound Care Skin barrier film applied 06/10/22 1300   Dressing Change Due 06/13/22 06/10/22 1300       Wound Location: left ischium  Skin Substitute: NuShield  Performed By: Ugo Stone  Time-out Taken: Yes  Location:     [] Genitalia/Hands/Feet/Multiple Digits    [] Scalp/Face/Neck/Ears    [x]Trunk/Arms/Legs  Application Area: (sq cm): 0.12   Product Applied: (sq cm): 6  Product Waste (sq cm): 0  Fenestrated: No   Instrument:    [] Blade [x] Curette  []Forceps  []Nippers    [] Rongeur [] Scissors  []Others:   Secured with: Steri-strips and contact layer  Dressing Applied: Yes  Response to Treatment:Well tolerated by patient.      Assessment:         ICD-10-CM ICD-9-CM   1. 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  L89.323 707.05     707.23   2. Pressure ulcer of ischium, stage 2, right  L89.312 707.05     707.22   3. Paraplegia  G82.20 344.1         Plan:   Tissue pathology and/or culture taken:  [] Yes [x] No   Sharp debridement performed:   [x] Yes [] No   Labs ordered this visit:   [] Yes [x]  No   Imaging ordered this visit:   [] Yes [x] No           Orders Placed This Encounter   Procedures    Change dressing     Right buttock pressure ulcer:   Cleanse wound with: Normal Saline   Lidocaine: PRN   Silver nitrate: PRN   Periwound care: Cavilon, Calmoseptine PRN   Primary dressing: Gisele, Hydrofera Ready   Secondary dressing: Border dressing 3x3       Left buttock pressure ulcer:   Cleanse wound with: Normal Saline   Lidocaine: PRN   Silver nitrate: PRN   Periwound care: Cavilon, Calmoseptine PRN   [facility] Gisele at home by caregiver  Secondary dressing: Border dressing 3x3     R lateral knee  Cleanse wound with: NS  Lidocaine: PRN  Silver nitrate: PRN  Periwound care: Cavilon  Primary dressing: hydrofera regular  Secondary dressing: 3x3 border dressing    Offloading: Roho cushion     Frequency: Mon, Wed, Fri and PRN per Cg.  Cg instructed/demonstrated wound care and verbalized understanding   Follow-up: in 2 weeks with Dr. Stone 06/24/22    Supplies needed for at home wound care 3 x per week:   Saline   Q-tips   Gauze 3x3   Hydrofera Ready   Border dressing 3x3   Promogran   cavilon        Follow up in 2 weeks (on 6/24/2022) for Dr Stone.

## 2022-06-13 NOTE — PROCEDURES
"Debridement    Date/Time: 6/10/2022 1:00 PM  Performed by: Ugo Stone MD  Authorized by: Ugo Stone MD   Associated wounds:        Wound 12/03/21 1300 Ulceration Right Ischial tuberosity  Time out: Immediately prior to procedure a "time out" was called to verify the correct patient, procedure, equipment, support staff and site/side marked as required.    Consent Done?:  Yes (Written)  Local anesthesia used?: Yes    Local anesthetic:  Topical anesthetic    Wound Details:    Location:  Right buttock       Length (cm):  1       Area (sq cm):  0.8       Width (cm):  0.8       Percent Debrided (%):  100       Depth (cm):  0.1       Total Area Debrided (sq cm):  0.8    Depth of debridement:  Subcutaneous tissue    Tissue debrided:  Subcutaneous    Devitalized tissue debrided:  Slough, Necrotic/Eschar, Fibrin and Clots    Instruments:  Curette    Bleeding:  Minimal  Hemostasis Achieved: Yes    Method Used:  Pressure  Patient tolerance:  Patient tolerated the procedure well with no immediate complications      "

## 2022-06-24 ENCOUNTER — HOSPITAL ENCOUNTER (OUTPATIENT)
Dept: WOUND CARE | Facility: HOSPITAL | Age: 62
Discharge: HOME | End: 2022-06-24
Attending: PREVENTIVE MEDICINE
Payer: MEDICARE

## 2022-06-24 VITALS
BODY MASS INDEX: 23.62 KG/M2 | SYSTOLIC BLOOD PRESSURE: 176 MMHG | TEMPERATURE: 98 F | WEIGHT: 165 LBS | DIASTOLIC BLOOD PRESSURE: 79 MMHG | HEIGHT: 70 IN | HEART RATE: 85 BPM

## 2022-06-24 DIAGNOSIS — L89.323 STAGE III PRESSURE ULCER OF LEFT BUTTOCK: Primary | ICD-10-CM

## 2022-06-24 DIAGNOSIS — G82.20 PARAPLEGIA: ICD-10-CM

## 2022-06-24 DIAGNOSIS — L89.312: ICD-10-CM

## 2022-06-24 DIAGNOSIS — Z98.890 S/P FLAP GRAFT: ICD-10-CM

## 2022-06-24 PROCEDURE — 15271 SKIN SUB GRAFT TRNK/ARM/LEG: CPT

## 2022-06-24 NOTE — PROGRESS NOTES
[facility]    Subjective:       Patient ID: Derik Fonseca is a 61 y.o. male.    Chief Complaint: Pressure Ulcer (Left buttock)    6/24/22: F/U with Dr. Stone. All sites improved. Nushield graft #9 applied to left ischial site today. [facility] [place] [id number]. ID # [id number]. Donor# [id number]. Product # [id number]. Exp: 04-. Continue POC.    Review of Systems   All other systems reviewed and are negative.        Objective:        Physical Exam       Altered Skin Integrity 06/04/21 1317 Left Buttocks Ulceration Full thickness tissue loss. Subcutaneous fat may be visible but bone, tendon or muscle are not exposed (Active)   06/04/21 1317   Altered Skin Integrity Present on Admission:    Side: Left   Orientation:    Location: Buttocks   Wound Number:    Is this injury device related?: No   Primary Wound Type: Ulceration   Description of Altered Skin Integrity: Full thickness tissue loss. Subcutaneous fat may be visible but bone, tendon or muscle are not exposed   Removal Indication and Assessment:    Wound Outcome:    (Retired) Wound Length (cm):    (Retired) Wound Width (cm):    (Retired) Depth (cm):    Wound Description (Comments):    Removal Indications:    Wound Image   06/24/22 1400   Dressing Appearance Intact;Moist drainage 06/24/22 1400   Drainage Amount Scant 06/24/22 1400   Drainage Characteristics/Odor Serosanguineous 06/24/22 1400   Appearance Pink;Moist 06/24/22 1400   Tissue loss description Full thickness 06/24/22 1400   Red (%), Wound Tissue Color 100 % 06/24/22 1400   Periwound Area Dry 06/24/22 1400   Wound Edges Jagged 06/24/22 1400   Wound Length (cm) 0.3 cm 06/24/22 1400   Wound Width (cm) 0.3 cm 06/24/22 1400   Wound Depth (cm) 0.9 cm 06/24/22 1400   Wound Volume (cm^3) 0.081 cm^3 06/24/22 1400   Wound Surface Area (cm^2) 0.09 cm^2 06/24/22 1400   Care Cleansed with:;Sterile normal saline 06/24/22 1400   Dressing Applied;Island/border;Other (comment)  06/24/22 1400   Periwound Care Skin barrier film applied 06/24/22 1400   Dressing Change Due 06/27/22 06/24/22 1400            Wound 12/03/21 1300 Ulceration Right Ischial tuberosity (Active)   12/03/21 1300    Pre-existing: Yes   Primary Wound Type: Ulceration   Side: Right   Orientation:    Location: Ischial tuberosity   Wound Number:    Ankle-Brachial Index:    Pulses:    Removal Indication and Assessment:    Wound Outcome:    (Retired) Wound Type:    (Retired) Wound Length (cm):    (Retired) Wound Width (cm):    (Retired) Depth (cm):    Wound Description (Comments):    Removal Indications:    Wound Image   06/24/22 1400   Dressing Appearance Moist drainage 06/24/22 1400   Drainage Amount Scant 06/24/22 1400   Drainage Characteristics/Odor Serosanguineous 06/24/22 1400   Appearance Red;Moist 06/24/22 1400   Tissue loss description Full thickness 06/24/22 1400   Red (%), Wound Tissue Color 100 % 06/24/22 1400   Periwound Area Dry 06/24/22 1400   Wound Edges Irregular 06/24/22 1400   Wound Length (cm) 0.8 cm 06/24/22 1400   Wound Width (cm) 0.5 cm 06/24/22 1400   Wound Depth (cm) 0.1 cm 06/24/22 1400   Wound Volume (cm^3) 0.04 cm^3 06/24/22 1400   Wound Surface Area (cm^2) 0.4 cm^2 06/24/22 1400   Care Cleansed with:;Sterile normal saline 06/24/22 1400   Dressing Applied;Collagen;Silver;Hydrofiber;Island/border 06/24/22 1400   Periwound Care Moisture barrier applied 06/24/22 1400   Dressing Change Due 06/27/22 06/24/22 1400            Wound 06/10/22 1300 Right anterior;lateral Knee (Active)   06/10/22 1300    Pre-existing: Yes   Primary Wound Type:    Side: Right   Orientation: anterior;lateral   Location: Knee   Wound Number:    Ankle-Brachial Index:    Pulses:    Removal Indication and Assessment:    Wound Outcome:    (Retired) Wound Type:    (Retired) Wound Length (cm):    (Retired) Wound Width (cm):    (Retired) Depth (cm):    Wound Description (Comments):    Removal Indications:    Dressing Appearance Moist  drainage 06/24/22 1400   Drainage Amount Scant 06/24/22 1400   Drainage Characteristics/Odor Serosanguineous 06/24/22 1400   Appearance Pink;Moist 06/24/22 1400   Tissue loss description Partial thickness 06/24/22 1400   Red (%), Wound Tissue Color 100 % 06/24/22 1400   Periwound Area Dry 06/24/22 1400   Wound Edges Irregular 06/24/22 1400   Wound Length (cm) 0.6 cm 06/24/22 1400   Wound Width (cm) 0.5 cm 06/24/22 1400   Wound Depth (cm) 0.1 cm 06/24/22 1400   Wound Volume (cm^3) 0.03 cm^3 06/24/22 1400   Wound Surface Area (cm^2) 0.3 cm^2 06/24/22 1400   Care Cleansed with:;Sterile normal saline 06/24/22 1400   Dressing Applied;Hydrofiber;Island/border 06/24/22 1400   Periwound Care Skin barrier film applied 06/24/22 1400   Dressing Change Due 06/27/22 06/24/22 1400       Wound Location: Left ischium  Skin Substitute: NuSheild  Performed By: gUo Stone  Time-out Taken: Yes  Location:     [] Genitalia/Hands/Feet/Multiple Digits    [] Scalp/Face/Neck/Ears    [x]Trunk/Arms/Legs  Application Area: (sq cm): 0.1   Product Applied: (sq cm): 6  Product Waste (sq cm): 0  Fenestrated: No   Instrument:    [] Blade [x] Curette  []Forceps  []Nippers    [] Rongeur [] Scissors  []Others:   Secured with: Steri-strips and contact layer  Dressing Applied: Yes  Response to Treatment:Well tolerated by patient.    Assessment:         ICD-10-CM ICD-9-CM   1. 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  L89.323 707.05     707.23   2. Pressure ulcer of ischium, stage 2, right  L89.312 707.05     707.22   3. Paraplegia  G82.20 344.1   4. S/P flap graft  Z98.890 V45.89         Plan:   Tissue pathology and/or culture taken:  [] Yes [x] No   Sharp debridement performed:   [] Yes [x] No   Labs ordered this visit:   [] Yes [x] No   Imaging ordered this visit:   [] Yes [x] No           Orders Placed This Encounter   Procedures    Change dressing     Right buttock pressure ulcer:   Cleanse wound with: Normal Saline   Lidocaine: PRN   Silver  nitrate: PRN   Periwound care: Cavilon, Calmoseptine PRN   Primary dressing: Gisele, Hydrofera Ready   Secondary dressing: Border dressing 3x3       Left buttock pressure ulcer:   Cleanse wound with: Normal Saline   Lidocaine: PRN   Silver nitrate: PRN   Periwound care: Cavilon, Calmoseptine PRN   [facility] Gisele at home by caregiver   Secondary dressing: Border dressing 3x3     R lateral knee   Cleanse wound with: NS   Lidocaine: PRN   Silver nitrate: PRN   Periwound care: Cavilon   Primary dressing: hydrofera regular   Secondary dressing: 3x3 border dressing     Offloading: Roho cushion     Frequency: Mon, Wed, Fri and PRN per Cg.  Cg instructed/demonstrated wound care and verbalized understanding   Follow-up: in 2 weeks with Dr. Stone 7/8/22    Supplies needed for at home wound care 3 x per week:   Saline   Q-tips   Gauze 3x3   Hydrofera Ready   Border dressing 3x3   Promogran   cavilon        Follow up in about 2 weeks (around 7/8/2022).

## 2022-08-26 ENCOUNTER — HOSPITAL ENCOUNTER (OUTPATIENT)
Dept: WOUND CARE | Facility: HOSPITAL | Age: 62
Discharge: HOME | End: 2022-08-26
Attending: PREVENTIVE MEDICINE
Payer: MEDICARE

## 2022-08-26 VITALS
HEART RATE: 76 BPM | HEIGHT: 70 IN | SYSTOLIC BLOOD PRESSURE: 130 MMHG | BODY MASS INDEX: 23.62 KG/M2 | WEIGHT: 165 LBS | DIASTOLIC BLOOD PRESSURE: 72 MMHG | TEMPERATURE: 98 F

## 2022-08-26 DIAGNOSIS — G82.20 PARAPLEGIA: ICD-10-CM

## 2022-08-26 DIAGNOSIS — Z98.890 S/P FLAP GRAFT: ICD-10-CM

## 2022-08-26 DIAGNOSIS — L89.312: ICD-10-CM

## 2022-08-26 DIAGNOSIS — L89.323 STAGE III PRESSURE ULCER OF LEFT BUTTOCK: Primary | ICD-10-CM

## 2022-08-26 PROCEDURE — 99214 OFFICE O/P EST MOD 30 MIN: CPT

## 2022-08-26 NOTE — PROGRESS NOTES
[facility]    Subjective:       Patient ID: Derik Fonseca is a 62 y.o. male.    Chief Complaint: Pressure Ulcer (Both buttocks)    Patient presents for follow up for his b/l ischial wounds. No acute complaints today. Care taker states the right side is looking much better.    Review of Systems   All other systems reviewed and are negative.      Objective:      Physical Exam       Altered Skin Integrity 06/04/21 1317 Left Buttocks Ulceration Full thickness tissue loss. Subcutaneous fat may be visible but bone, tendon or muscle are not exposed (Active)   06/04/21 1317   Altered Skin Integrity Present on Admission:    Side: Left   Orientation:    Location: Buttocks   Wound Number:    Is this injury device related?: No   Primary Wound Type: Ulceration   Description of Altered Skin Integrity: Full thickness tissue loss. Subcutaneous fat may be visible but bone, tendon or muscle are not exposed   Removal Indication and Assessment:    Wound Outcome:    (Retired) Wound Length (cm):    (Retired) Wound Width (cm):    (Retired) Depth (cm):    Wound Description (Comments):    Removal Indications:    Wound Image   08/26/22 1300   Dressing Appearance Moist drainage 08/26/22 1300   Drainage Amount Moderate 08/26/22 1300   Drainage Characteristics/Odor Serosanguineous 08/26/22 1300   Appearance Pink;Moist 08/26/22 1300   Tissue loss description Full thickness 08/26/22 1300   Red (%), Wound Tissue Color 100 % 08/26/22 1300   Periwound Area Dry 08/26/22 1300   Wound Edges Irregular 08/26/22 1300   Wound Length (cm) 0.3 cm 08/26/22 1300   Wound Width (cm) 0.4 cm 08/26/22 1300   Wound Depth (cm) 1 cm 08/26/22 1300   Wound Volume (cm^3) 0.12 cm^3 08/26/22 1300   Wound Surface Area (cm^2) 0.12 cm^2 08/26/22 1300   Care Cleansed with:;Sterile normal saline 08/26/22 1300   Dressing Applied;Collagen;Silver;Hydrofiber;Island/border 08/26/22 1300   Periwound Care Skin barrier film applied  08/26/22 1300   Off Loading Other (see comments) 08/26/22 1300   Dressing Change Due 08/29/22 08/26/22 1300            Wound 12/03/21 1300 Ulceration Right Ischial tuberosity (Active)   12/03/21 1300    Pre-existing: Yes   Primary Wound Type: Ulceration   Side: Right   Orientation:    Location: Ischial tuberosity   Wound Number:    Ankle-Brachial Index:    Pulses:    Removal Indication and Assessment:    Wound Outcome:    (Retired) Wound Type:    (Retired) Wound Length (cm):    (Retired) Wound Width (cm):    (Retired) Depth (cm):    Wound Description (Comments):    Removal Indications:    Wound Image   08/26/22 1300   Dressing Appearance Dry;Intact 08/26/22 1300   Drainage Amount None 08/26/22 1300   Appearance Pink;Dry;Closed/resurfaced 08/26/22 1300   Tissue loss description Not applicable 08/26/22 1300   Red (%), Wound Tissue Color 100 % 08/26/22 1300   Periwound Area Dry;Scar tissue;[place] 08/26/22 1300   Wound Edges Rolled/closed 08/26/22 1300   Wound Length (cm) 0 cm 08/26/22 1300   Wound Width (cm) 0 cm 08/26/22 1300   Wound Depth (cm) 0 cm 08/26/22 1300   Wound Volume (cm^3) 0 cm^3 08/26/22 1300   Wound Surface Area (cm^2) 0 cm^2 08/26/22 1300   Care Sterile normal saline 08/26/22 1300   Dressing Applied;Hydrofiber;Island/border 08/26/22 1300   Periwound Care Skin barrier film applied 08/26/22 1300   Off Loading Other (see comments) 08/26/22 1300   Dressing Change Due 08/29/22 08/26/22 1300         Assessment:         ICD-10-CM ICD-9-CM   1. 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  L89.323 707.05     707.23   2. Pressure ulcer of ischium, stage 2, right  L89.312 707.05     707.22   3. Paraplegia  G82.20 344.1   4. S/P flap graft  Z98.890 V45.89       Right ischial wound is almost fully resolved. Left side is improved since last visit. Continue with collagen and offloading.      Plan:   Tissue pathology and/or culture taken:  [] Yes [x] No   Sharp debridement performed:   [] Yes [x] No   Labs ordered this  visit:   [] Yes [x] No   Imaging ordered this visit:   [] Yes [x] No           Orders Placed This Encounter   Procedures    Change dressing     Right buttock pressure ulcer:   Cleanse wound with: Normal Saline   Lidocaine: PRN   Silver nitrate: PRN   Periwound care: Cavilon, Calmoseptine PRN   Primary dressing: Hydrofera Ready   Secondary dressing: Border dressing 3x3       Left buttock pressure ulcer:   Cleanse wound with: Normal Saline   Lidocaine: PRN   Silver nitrate: PRN   Periwound care: Cavilon, Calmoseptine PRN   Primary dressing: Gisele, hydrofera ready  Secondary dressing: Border dressing 3x3     Offloading: Roho cushion     Frequency: Mon, Wed, Fri and PRN per Cg.  Cg instructed/demonstrated wound care and verbalized understanding   Follow-up: in 3 weeks 9/16/22    Supplies needed for at home wound care 3 x per week:   Saline   Q-tips   Gauze 3x3   Hydrofera Ready   Border dressing 3x3   Promogran   cavilon        Follow up in about 3 weeks (around 9/16/2022).

## 2022-09-30 ENCOUNTER — HOSPITAL ENCOUNTER (OUTPATIENT)
Dept: WOUND CARE | Facility: HOSPITAL | Age: 62
Discharge: HOME | End: 2022-09-30
Attending: PREVENTIVE MEDICINE
Payer: MEDICARE

## 2022-09-30 VITALS
WEIGHT: 165 LBS | HEART RATE: 71 BPM | SYSTOLIC BLOOD PRESSURE: 90 MMHG | HEIGHT: 70 IN | TEMPERATURE: 98 F | BODY MASS INDEX: 23.62 KG/M2 | DIASTOLIC BLOOD PRESSURE: 59 MMHG

## 2022-09-30 DIAGNOSIS — L89.323 STAGE III PRESSURE ULCER OF LEFT BUTTOCK: Primary | ICD-10-CM

## 2022-09-30 DIAGNOSIS — L89.309: ICD-10-CM

## 2022-09-30 PROCEDURE — 11043 DBRDMT MUSC&/FSCA 1ST 20/<: CPT

## 2022-09-30 NOTE — PROGRESS NOTES
[facility]    Subjective:       Patient ID: Derik Fonseca is a 62 y.o. male.    Chief Complaint: Wound Care    Patient to wound care center with new wound on RLE.   New orders noted, No other concerns. Supply order requested.   Review of Systems   All other systems reviewed and are negative.      Objective:      Physical Exam       Altered Skin Integrity 06/04/21 1317 Left Buttocks Ulceration Full thickness tissue loss. Subcutaneous fat may be visible but bone, tendon or muscle are not exposed (Active)   06/04/21 1317   Altered Skin Integrity Present on Admission:    Side: Left   Orientation:    Location: Buttocks   Wound Number:    Is this injury device related?: No   Primary Wound Type: Ulceration   Description of Altered Skin Integrity: Full thickness tissue loss. Subcutaneous fat may be visible but bone, tendon or muscle are not exposed   Ankle-Brachial Index:    Pulses:    Removal Indication and Assessment:    Wound Outcome:    (Retired) Wound Length (cm):    (Retired) Wound Width (cm):    (Retired) Depth (cm):    Wound Description (Comments):    Removal Indications:    Wound Image   09/30/22 1356   Dressing Appearance Intact;Moist drainage 09/30/22 1356   Drainage Amount Moderate 09/30/22 1356   Drainage Characteristics/Odor Serosanguineous 09/30/22 1356   Appearance Intact;Pink;Red;Moist 09/30/22 1356   Tissue loss description Full thickness 09/30/22 1356   Red (%), Wound Tissue Color 100 % 09/30/22 1356   Periwound Area Intact;Redness 09/30/22 1356   Wound Edges Irregular 09/30/22 1356   Wound Length (cm) 0.3 cm 09/30/22 1356   Wound Width (cm) 0.5 cm 09/30/22 1356   Wound Depth (cm) 0.7 cm 09/30/22 1356   Wound Volume (cm^3) 0.105 cm^3 09/30/22 1356   Wound Surface Area (cm^2) 0.15 cm^2 09/30/22 1356   Care Cleansed with:;Sterile normal saline 09/30/22 1356   Dressing Applied;Changed;Collagen;Hydrofiber;Silver;Island/border 09/30/22 1356   Periwound Care Skin  barrier film applied 09/30/22 1356   Off Loading Other (see comments) 09/30/22 1356   Dressing Change Due 10/04/22 09/30/22 1356            Altered Skin Integrity 09/30/22 0140 Right lower Leg (Active)   09/30/22 0140   Altered Skin Integrity Present on Admission:    Side: Right   Orientation: lower   Location: Leg   Wound Number:    Is this injury device related?:    Primary Wound Type:    Description of Altered Skin Integrity:    Ankle-Brachial Index:    Pulses:    Removal Indication and Assessment:    Wound Outcome:    (Retired) Wound Length (cm):    (Retired) Wound Width (cm):    (Retired) Depth (cm):    Wound Description (Comments):    Removal Indications:    Wound Image   09/30/22 1356   Dressing Appearance Intact;Moist drainage 09/30/22 1356   Drainage Amount Moderate 09/30/22 1356   Drainage Characteristics/Odor Serosanguineous 09/30/22 1356   Appearance Intact;Pink;Yellow 09/30/22 1356   Tissue loss description Full thickness 09/30/22 1356   Red (%), Wound Tissue Color 90 % 09/30/22 1356   Yellow (%), Wound Tissue Color 10 % 09/30/22 1356   Periwound Area Intact 09/30/22 1356   Wound Edges Defined;Open 09/30/22 1356   Wound Length (cm) 0.8 cm 09/30/22 1356   Wound Width (cm) 1 cm 09/30/22 1356   Wound Depth (cm) 0.2 cm 09/30/22 1356   Wound Volume (cm^3) 0.16 cm^3 09/30/22 1356   Wound Surface Area (cm^2) 0.8 cm^2 09/30/22 1356   Care Cleansed with:;Sterile normal saline 09/30/22 1356   Dressing Applied;Changed;Hydrofiber;Island/border 09/30/22 1356   Periwound Care Skin barrier film applied 09/30/22 1356   Dressing Change Due 10/04/22 09/30/22 1356            Wound 12/03/21 1300 Ulceration Right Ischial tuberosity (Active)   12/03/21 1300    Pre-existing: Yes   Primary Wound Type: Ulceration   Side: Right   Orientation:    Location: Ischial tuberosity   Wound Number:    Ankle-Brachial Index:    Pulses:    Removal Indication and Assessment:    Wound Outcome:    (Retired) Wound Type:    (Retired) Wound  Length (cm):    (Retired) Wound Width (cm):    (Retired) Depth (cm):    Wound Description (Comments):    Removal Indications:    Wound Image   09/30/22 1356   Dressing Appearance Dry;Intact 09/30/22 1356   Drainage Amount None 09/30/22 1356   Appearance Pink;Dry;Closed/resurfaced 09/30/22 1356   Tissue loss description Not applicable 09/30/22 1356   Red (%), Wound Tissue Color 100 % 09/30/22 1356   Periwound Area Dry;[place] 09/30/22 1356   Wound Edges Rolled/closed 09/30/22 1356   Wound Length (cm) 0 cm 09/30/22 1356   Wound Width (cm) 0 cm 09/30/22 1356   Wound Depth (cm) 0 cm 09/30/22 1356   Wound Volume (cm^3) 0 cm^3 09/30/22 1356   Wound Surface Area (cm^2) 0 cm^2 09/30/22 1356   Care Cleansed with:;Sterile normal saline 09/30/22 1356   Dressing Applied;Changed;Hydrofiber;Island/border 09/30/22 1356   Periwound Care Skin barrier film applied 09/30/22 1356   Off Loading Other (see comments) 09/30/22 1356   Dressing Change Due 10/04/22 09/30/22 1356         Assessment:         ICD-10-CM ICD-9-CM   1. 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  L89.323 707.05     707.23   2. Decubitus ulcer of ischial area  L89.309 707.05     707.20       Ischial wound is improving.  Plan:   Tissue pathology and/or culture taken:  [] Yes [x] No   Sharp debridement performed:   [x] Yes [] No   Labs ordered this visit:   [] Yes [x] No   Imaging ordered this visit:   [] Yes [x] No           Orders Placed This Encounter   Procedures    Change dressing     Right buttock pressure ulcer and Right lower leg  Cleanse wound with: Normal Saline   Lidocaine: PRN   Silver nitrate: PRN   Periwound care: Cavilon, Calmoseptine PRN   Primary dressing: Hydrofera Ready   Secondary dressing: Border dressing 3x3       Left buttock pressure ulcer:   Cleanse wound with: Normal Saline   Lidocaine: PRN   Silver nitrate: PRN   Periwound care: Cavilon, Calmoseptine PRN   Primary dressing: Gisele, hydrofera ready   Secondary dressing: Border dressing 3x3      Offloading: Roho cushion     Frequency: Mon, Wed, Fri and PRN per Cg.  Cg instructed/demonstrated wound care and verbalized understanding   Follow-up: in 3 weeks 10/21/22    Supplies needed for at home wound care 3 x per week:   Saline   Q-tips   Gauze 3x3   Hydrofera Ready   Border dressing 3x3   Promogran   cavilon        Follow up in about 3 weeks (around 10/21/2022).

## 2022-10-03 NOTE — PROCEDURES
"Debridement    Date/Time: 9/30/2022 1:00 PM  Performed by: Ugo Stone MD  Authorized by: Ugo Stone MD     Time out: Immediately prior to procedure a "time out" was called to verify the correct patient, procedure, equipment, support staff and site/side marked as required.    Consent Done?:  Yes (Written)  Local anesthesia used?: Yes    Local anesthetic:  Topical anesthetic    Wound Details:    Location:  Left buttock    Type of Debridement:  Excisional       Length (cm):  0.3       Area (sq cm):  0.15       Width (cm):  0.5       Percent Debrided (%):  100       Depth (cm):  0.7       Total Area Debrided (sq cm):  0.15    Depth of debridement:  Muscle/fascia/tendon    Tissue debrided:  Muscle and Subcutaneous    Devitalized tissue debrided:  Slough    Instruments:  Curette    Bleeding:  Minimal  Hemostasis Achieved: Yes    Method Used:  Pressure  Patient tolerance:  Patient tolerated the procedure well with no immediate complications  "

## 2022-10-21 ENCOUNTER — HOSPITAL ENCOUNTER (OUTPATIENT)
Dept: WOUND CARE | Facility: HOSPITAL | Age: 62
Discharge: HOME | End: 2022-10-21
Attending: PREVENTIVE MEDICINE
Payer: MEDICARE

## 2022-10-21 VITALS
SYSTOLIC BLOOD PRESSURE: 175 MMHG | DIASTOLIC BLOOD PRESSURE: 84 MMHG | HEIGHT: 70 IN | BODY MASS INDEX: 23.62 KG/M2 | HEART RATE: 68 BPM | WEIGHT: 165 LBS | TEMPERATURE: 98 F

## 2022-10-21 DIAGNOSIS — G82.20 PARAPLEGIA: ICD-10-CM

## 2022-10-21 DIAGNOSIS — L89.892 PRESSURE INJURY OF RIGHT KNEE, STAGE 2: ICD-10-CM

## 2022-10-21 DIAGNOSIS — L89.323 STAGE III PRESSURE ULCER OF LEFT BUTTOCK: Primary | ICD-10-CM

## 2022-10-21 PROCEDURE — 99214 OFFICE O/P EST MOD 30 MIN: CPT

## 2022-10-21 PROCEDURE — 97597 DBRDMT OPN WND 1ST 20 CM/<: CPT

## 2022-10-21 NOTE — PROGRESS NOTES
[facility]    Subjective:       Patient ID: Derik Fonseca is a 62 y.o. male.    Chief Complaint: Wound Care and Pressure Ulcer    Follow up related to right lower leg and left buttock wounds.  Wound improvement noted to left buttock and right lower leg wounds.  Review of Systems   All other systems reviewed and are negative.      Objective:      Physical Exam       Altered Skin Integrity 06/04/21 1317 Left Buttocks Ulceration Full thickness tissue loss. Subcutaneous fat may be visible but bone, tendon or muscle are not exposed (Active)   06/04/21 1317   Altered Skin Integrity Present on Admission:    Side: Left   Orientation:    Location: Buttocks   Wound Number:    Is this injury device related?: No   Primary Wound Type: Ulceration   Description of Altered Skin Integrity: Full thickness tissue loss. Subcutaneous fat may be visible but bone, tendon or muscle are not exposed   Ankle-Brachial Index:    Pulses:    Removal Indication and Assessment:    Wound Outcome:    (Retired) Wound Length (cm):    (Retired) Wound Width (cm):    (Retired) Depth (cm):    Wound Description (Comments):    Removal Indications:    Wound Image   10/21/22 1300   Dressing Appearance Intact;Moist drainage 10/21/22 1300   Drainage Amount Moderate 10/21/22 1300   Drainage Characteristics/Odor Serosanguineous 10/21/22 1300   Appearance Pink;Moist 10/21/22 1300   Tissue loss description Full thickness 10/21/22 1300   Red (%), Wound Tissue Color 100 % 10/21/22 1300   Periwound Area Intact;[place] 10/21/22 1300   Wound Edges Irregular 10/21/22 1300   Wound Length (cm) 0.3 cm 10/21/22 1300   Wound Width (cm) 0.5 cm 10/21/22 1300   Wound Depth (cm) 0.7 cm 10/21/22 1300   Wound Volume (cm^3) 0.105 cm^3 10/21/22 1300   Wound Surface Area (cm^2) 0.15 cm^2 10/21/22 1300   Care Cleansed with:;Soap and water;Sterile normal saline 10/21/22 1300   Dressing Applied;Silver;Collagen;Hydrofiber;Foam;Island/border  10/21/22 1300   Periwound Care Skin barrier film applied 10/21/22 1300   Off Loading Other (see comments) 10/21/22 1300   Dressing Change Due 10/24/22 10/21/22 1300            Altered Skin Integrity 09/30/22 0140 Right lower Leg (Active)   09/30/22 0140   Altered Skin Integrity Present on Admission:    Side: Right   Orientation: lower   Location: Leg   Wound Number:    Is this injury device related?:    Primary Wound Type:    Description of Altered Skin Integrity:    Ankle-Brachial Index:    Pulses:    Removal Indication and Assessment:    Wound Outcome:    (Retired) Wound Length (cm):    (Retired) Wound Width (cm):    (Retired) Depth (cm):    Wound Description (Comments):    Removal Indications:    Wound Image   10/21/22 1300   Dressing Appearance Intact;Moist drainage 10/21/22 1300   Drainage Amount Moderate 10/21/22 1300   Drainage Characteristics/Odor Serosanguineous 10/21/22 1300   Appearance Pink;Yellow;Dry 10/21/22 1300   Tissue loss description Full thickness 10/21/22 1300   Red (%), Wound Tissue Color 50 % 10/21/22 1300   Yellow (%), Wound Tissue Color 50 % 10/21/22 1300   Periwound Area Intact;Dry;[place] 10/21/22 1300   Wound Edges Defined 10/21/22 1300   Wound Length (cm) 0.7 cm 10/21/22 1300   Wound Width (cm) 0.7 cm 10/21/22 1300   Wound Depth (cm) 0.1 cm 10/21/22 1300   Wound Volume (cm^3) 0.049 cm^3 10/21/22 1300   Wound Surface Area (cm^2) 0.49 cm^2 10/21/22 1300   Care Cleansed with:;Soap and water;Sterile normal saline 10/21/22 1300   Dressing Applied;Hydrofiber;Foam;Island/border 10/21/22 1300   Periwound Care Skin barrier film applied 10/21/22 1300   Dressing Change Due 10/24/22 10/21/22 1300            Wound 12/03/21 1300 Ulceration Right Ischial tuberosity (Active)   12/03/21 1300    Pre-existing: Yes   Primary Wound Type: Ulceration   Side: Right   Orientation:    Location: Ischial tuberosity   Wound Number:    Ankle-Brachial Index:    Pulses:    Removal Indication and Assessment:    Wound  Outcome:    (Retired) Wound Type:    (Retired) Wound Length (cm):    (Retired) Wound Width (cm):    (Retired) Depth (cm):    Wound Description (Comments):    Removal Indications:    Wound Image   10/21/22 1300   Dressing Appearance Dry;Intact;Clean 10/21/22 1300   Drainage Amount None 10/21/22 1300   Appearance Closed/resurfaced;Pink 10/21/22 1300   Tissue loss description Not applicable 10/21/22 1300   Periwound Area Dry;[place] 10/21/22 1300   Wound Edges Rolled/closed 10/21/22 1300   Wound Length (cm) 0 cm 10/21/22 1300   Wound Width (cm) 0 cm 10/21/22 1300   Wound Depth (cm) 0 cm 10/21/22 1300   Wound Volume (cm^3) 0 cm^3 10/21/22 1300   Wound Surface Area (cm^2) 0 cm^2 10/21/22 1300   Care Cleansed with:;Soap and water;Sterile normal saline 10/21/22 1300   Dressing Applied;Foam;Island/border 10/21/22 1300   Periwound Care Skin barrier film applied 10/21/22 1300   Dressing Change Due 10/24/22 10/21/22 1300         Assessment/Plan:              ICD-10-CM ICD-9-CM   1. 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  L89.323 707.05     707.23   2. Pressure injury of right knee, stage 2  L89.892 707.09     707.22   3. Paraplegia  G82.20 344.1           Tissue pathology and/or culture taken:  [] Yes [x] No   Sharp debridement performed:   [x] Yes [] No   Labs ordered this visit:   [] Yes [x] No   Imaging ordered this visit:   [] Yes [x] No     Wounds improving. Continue to offload as much as possible.      Orders Placed This Encounter   Procedures    Change dressing     Right buttock pressure ulcer and Right lower leg   Cleanse wound with: Normal Saline   Lidocaine: PRN   Silver nitrate: PRN   Periwound care: Cavilon, Calmoseptine PRN   Primary dressing: Hydrofera Ready   Secondary dressing: Border dressing 3x3       Left buttock pressure ulcer:   Cleanse wound with: Normal Saline   Lidocaine: PRN   Silver nitrate: PRN   Periwound care: Cavilon, Calmoseptine PRN   Primary dressing: Gisele, hydrofera ready   Secondary  dressing: Border dressing 3x3     Offloading: Roho cushion     Frequency: Mon, Wed, Fri and PRN per Cg.  Cg instructed/demonstrated wound care and verbalized understanding   Follow-up: in 3 weeks with Dr Stone 11/11/22     Supplies needed for at home wound care 3 x per week:   Cavilon  Saline   Q-tips   Gauze 3x3   Hydrofera Ready   Border dressing 3x3   Promogran        Follow up in 3 weeks (on 11/11/2022) for Dr Stone.              Nurse's note.  Follow up with Dr Stone related to right lower leg and left buttock wounds.  Right buttock has not reopened.  Wound improvement noted by MD to left buttock and right lower leg wounds.  Continue current plan of care.  Follow up with Dr Stone in 3 weeks on 11/11/2022.

## 2022-10-24 NOTE — PROCEDURES
"Debridement    Date/Time: 10/21/2022 12:58 PM  Performed by: Ugo Stone MD  Authorized by: Ugo Stone MD     Time out: Immediately prior to procedure a "time out" was called to verify the correct patient, procedure, equipment, support staff and site/side marked as required.    Consent Done?:  Yes (Written)  Local anesthesia used?: Yes    Local anesthetic:  Topical anesthetic    Wound Details:    Location:  Right knee    Type of Debridement:  Excisional       Length (cm):  0.7       Area (sq cm):  0.49       Width (cm):  0.7       Percent Debrided (%):  100       Depth (cm):  0.1       Total Area Debrided (sq cm):  0.49    Depth of debridement:  Epidermis/Dermis    Tissue debrided:  Dermis and Epidermis    Devitalized tissue debrided:  Slough, Fibrin and Exudate    Instruments:  Curette    Bleeding:  Minimal  Hemostasis Achieved: Yes    Method Used:  Pressure  Patient tolerance:  Patient tolerated the procedure well with no immediate complications  "

## 2022-11-11 ENCOUNTER — HOSPITAL ENCOUNTER (OUTPATIENT)
Dept: WOUND CARE | Facility: HOSPITAL | Age: 62
Discharge: HOME | End: 2022-11-11
Attending: PREVENTIVE MEDICINE
Payer: MEDICARE

## 2022-11-11 VITALS — HEART RATE: 60 BPM | TEMPERATURE: 99 F | SYSTOLIC BLOOD PRESSURE: 73 MMHG | DIASTOLIC BLOOD PRESSURE: 46 MMHG

## 2022-11-11 DIAGNOSIS — G82.20 PARAPLEGIA: ICD-10-CM

## 2022-11-11 DIAGNOSIS — L89.323 STAGE III PRESSURE ULCER OF LEFT BUTTOCK: Primary | ICD-10-CM

## 2022-11-11 DIAGNOSIS — L89.312: ICD-10-CM

## 2022-11-11 DIAGNOSIS — Z98.890 S/P FLAP GRAFT: ICD-10-CM

## 2022-11-11 PROCEDURE — 99212 OFFICE O/P EST SF 10 MIN: CPT

## 2022-11-11 NOTE — PROGRESS NOTES
[facility]    Subjective:       Patient ID: Derik Fonseca is a 62 y.o. male.    Chief Complaint: Wound Care    Patient to wound care center for right buttock wound,  improving.  No new concerns, continue with the current plan of care.    Review of Systems   All other systems reviewed and are negative.      Objective:      Physical Exam       Altered Skin Integrity 06/04/21 1317 Left Buttocks Ulceration Full thickness tissue loss. Subcutaneous fat may be visible but bone, tendon or muscle are not exposed (Active)   06/04/21 1317   Altered Skin Integrity Present on Admission:    Side: Left   Orientation:    Location: Buttocks   Wound Number:    Is this injury device related?: No   Primary Wound Type: Ulceration   Description of Altered Skin Integrity: Full thickness tissue loss. Subcutaneous fat may be visible but bone, tendon or muscle are not exposed   Ankle-Brachial Index:    Pulses:    Removal Indication and Assessment:    Wound Outcome:    (Retired) Wound Length (cm):    (Retired) Wound Width (cm):    (Retired) Depth (cm):    Wound Description (Comments):    Removal Indications:    Wound Image   11/11/22 1508   Dressing Appearance Intact;Moist drainage 11/11/22 1508   Drainage Amount Moderate 11/11/22 1508   Drainage Characteristics/Odor Serosanguineous 11/11/22 1508   Appearance Intact;Pink 11/11/22 1508   Tissue loss description Full thickness 11/11/22 1508   Red (%), Wound Tissue Color 100 % 11/11/22 1508   Periwound Area Intact;[place]hili 11/11/22 1508   Wound Edges Irregular 11/11/22 1508   Wound Length (cm) 0.3 cm 11/11/22 1508   Wound Width (cm) 0.4 cm 11/11/22 1508   Wound Depth (cm) 0.5 cm 11/11/22 1508   Wound Volume (cm^3) 0.06 cm^3 11/11/22 1508   Wound Surface Area (cm^2) 0.12 cm^2 11/11/22 1508   Care Cleansed with:;Soap and water;Sterile normal saline 11/11/22 1508   Dressing Applied;Changed;Collagen;Silver;Hydrofiber;Foam;Island/border 11/11/22 1508    Periwound Care Skin barrier film applied 11/11/22 1508   Off Loading Other (see comments) 11/11/22 1508   Dressing Change Due 12/02/22 11/11/22 1508            Wound 12/03/21 1300 Ulceration Right Ischial tuberosity (Active)   12/03/21 1300    Pre-existing: Yes   Primary Wound Type: Ulceration   Side: Right   Orientation:    Location: Ischial tuberosity   Wound Number:    Ankle-Brachial Index:    Pulses:    Removal Indication and Assessment:    Wound Outcome:    (Retired) Wound Type:    (Retired) Wound Length (cm):    (Retired) Wound Width (cm):    (Retired) Depth (cm):    Wound Description (Comments):    Removal Indications:    Dressing Appearance Intact 11/11/22 1508   Drainage Amount None 11/11/22 1508   Appearance Closed/resurfaced 11/11/22 1508   Tissue loss description Not applicable 11/11/22 1508   Red (%), Wound Tissue Color 100 % 11/11/22 1508   Periwound Area Intact;Dry;Severance 11/11/22 1508   Wound Edges Rolled/closed 11/11/22 1508   Wound Length (cm) 0 cm 11/11/22 1508   Wound Width (cm) 0 cm 11/11/22 1508   Wound Depth (cm) 0 cm 11/11/22 1508   Wound Volume (cm^3) 0 cm^3 11/11/22 1508   Wound Surface Area (cm^2) 0 cm^2 11/11/22 1508   Care Cleansed with:;Sterile normal saline 11/11/22 1508   Dressing Applied;Changed;Hydrofiber;Island/border 11/11/22 1508   Periwound Care Skin barrier film applied 11/11/22 1508   Dressing Change Due 12/02/22 11/11/22 1508         Assessment/Plan:         ICD-10-CM ICD-9-CM   1. 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  L89.323 707.05     707.23   2. Paraplegia  G82.20 344.1   3. Pressure ulcer of ischium, stage 2, right  L89.312 707.05     707.22   4. S/P flap graft  Z98.890 V45.89       Wound continues to improve.    Tissue pathology and/or culture taken:  [] Yes [x] No   Sharp debridement performed:   [] Yes [x] No   Labs ordered this visit:   [] Yes [x] No   Imaging ordered this visit:   [] Yes [x] No           Orders Placed This Encounter   Procedures    Change  dressing     Right buttock pressure ulcer and Right lower leg   Cleanse wound with: Normal Saline   Lidocaine: PRN   Silver nitrate: PRN   Periwound care: Cavilon, Calmoseptine PRN   Primary dressing: Hydrofera Ready   Secondary dressing: Border dressing 3x3       Left buttock pressure ulcer:   Cleanse wound with: Normal Saline   Lidocaine: PRN   Silver nitrate: PRN   Periwound care: Cavilon, Calmoseptine PRN   Primary dressing: Gisele, hydrofera ready   Secondary dressing: Border dressing 3x3     Offloading: Roho cushion     Frequency: Mon, Wed, Fri and PRN per Cg.  Cg instructed/demonstrated wound care and verbalized understanding   Follow-up: in 3 weeks with Dr Stone 12/02/22     Supplies needed for at home wound care 3 x per week:   Cavilon   Saline   Q-tips   Gauze 3x3   Hydrofera Ready   Border dressing 3x3   Promogran        Follow up in about 3 weeks (around 12/2/2022).

## 2022-11-29 ENCOUNTER — OFFICE VISIT (OUTPATIENT)
Dept: URGENT CARE | Facility: CLINIC | Age: 62
End: 2022-11-29
Payer: MEDICARE

## 2022-11-29 VITALS
DIASTOLIC BLOOD PRESSURE: 61 MMHG | SYSTOLIC BLOOD PRESSURE: 97 MMHG | OXYGEN SATURATION: 96 % | WEIGHT: 165 LBS | HEART RATE: 77 BPM | HEIGHT: 70 IN | RESPIRATION RATE: 20 BRPM | BODY MASS INDEX: 23.62 KG/M2 | TEMPERATURE: 99 F

## 2022-11-29 DIAGNOSIS — M79.89 PAIN AND SWELLING OF LOWER EXTREMITY, LEFT: Primary | ICD-10-CM

## 2022-11-29 DIAGNOSIS — M79.605 PAIN AND SWELLING OF LOWER EXTREMITY, LEFT: Primary | ICD-10-CM

## 2022-11-29 PROBLEM — E87.1 HYPONATREMIA: Status: ACTIVE | Noted: 2022-11-29

## 2022-11-29 PROBLEM — E87.6 HYPOKALEMIA: Status: ACTIVE | Noted: 2022-11-29

## 2022-11-29 PROBLEM — M46.28 OSTEOMYELITIS OF COCCYX: Status: ACTIVE | Noted: 2022-11-29

## 2022-11-29 PROBLEM — L03.90 CELLULITIS: Status: ACTIVE | Noted: 2022-11-29

## 2022-11-29 PROBLEM — L89.159 PRESSURE INJURY OF SKIN OF SACRAL REGION: Status: ACTIVE | Noted: 2022-11-29

## 2022-11-29 PROBLEM — M71.129 INFECTED OLECRANON BURSA: Status: ACTIVE | Noted: 2022-11-29

## 2022-11-29 PROBLEM — M25.9 ELBOW DISORDER: Status: ACTIVE | Noted: 2022-11-29

## 2022-11-29 PROBLEM — S72.90XA FRACTURE OF FEMUR: Status: ACTIVE | Noted: 2022-11-29

## 2022-11-29 PROBLEM — K22.2 LOWER ESOPHAGEAL RING: Status: ACTIVE | Noted: 2022-11-29

## 2022-11-29 PROBLEM — M79.606 PAIN IN LOWER LIMB: Status: ACTIVE | Noted: 2022-11-29

## 2022-11-29 PROBLEM — R50.82 POSTOPERATIVE FEVER: Status: ACTIVE | Noted: 2022-11-29

## 2022-11-29 PROCEDURE — 99214 PR OFFICE/OUTPT VISIT, EST, LEVL IV, 30-39 MIN: ICD-10-PCS | Mod: S$GLB,,, | Performed by: STUDENT IN AN ORGANIZED HEALTH CARE EDUCATION/TRAINING PROGRAM

## 2022-11-29 PROCEDURE — 99214 OFFICE O/P EST MOD 30 MIN: CPT | Mod: S$GLB,,, | Performed by: STUDENT IN AN ORGANIZED HEALTH CARE EDUCATION/TRAINING PROGRAM

## 2022-11-29 PROCEDURE — 73502 XR HIP WITH PELVIS WHEN PERFORMED, 2 OR 3 VIEWS LEFT: ICD-10-PCS | Mod: FY,LT,S$GLB, | Performed by: RADIOLOGY

## 2022-11-29 PROCEDURE — 73502 X-RAY EXAM HIP UNI 2-3 VIEWS: CPT | Mod: FY,LT,S$GLB, | Performed by: RADIOLOGY

## 2022-11-29 RX ORDER — CLOBETASOL PROPIONATE 0.46 MG/ML
SOLUTION TOPICAL
COMMUNITY

## 2022-11-29 RX ORDER — METRONIDAZOLE 500 MG/1
TABLET ORAL
COMMUNITY

## 2022-11-29 RX ORDER — IBUPROFEN 100 MG/5ML
1000 SUSPENSION, ORAL (FINAL DOSE FORM) ORAL
COMMUNITY

## 2022-11-29 RX ORDER — ATORVASTATIN CALCIUM 20 MG/1
TABLET, FILM COATED ORAL
COMMUNITY

## 2022-11-29 RX ORDER — GRANULES FOR ORAL 3 G/1
POWDER ORAL
COMMUNITY
Start: 2022-11-21

## 2022-11-29 RX ORDER — METHENAMINE HIPPURATE 1000 MG/1
TABLET ORAL
COMMUNITY

## 2022-11-29 RX ORDER — AMPICILLIN 500 MG/1
CAPSULE ORAL
COMMUNITY

## 2022-11-29 RX ORDER — AMOXICILLIN AND CLAVULANATE POTASSIUM 875; 125 MG/1; MG/1
TABLET, FILM COATED ORAL
COMMUNITY

## 2022-11-29 RX ORDER — PANTOPRAZOLE SODIUM 40 MG/1
40 TABLET, DELAYED RELEASE ORAL
COMMUNITY
Start: 2022-11-14

## 2022-11-29 RX ORDER — HYDROCODONE BITARTRATE AND ACETAMINOPHEN 7.5; 3 MG/1; MG/1
TABLET ORAL
COMMUNITY

## 2022-11-29 RX ORDER — LEVOFLOXACIN 500 MG/1
TABLET, FILM COATED ORAL
COMMUNITY

## 2022-11-29 RX ORDER — ASPIRIN 81 MG/1
81 TABLET ORAL
COMMUNITY

## 2022-11-29 RX ORDER — NEOMYCIN SULFATE, POLYMYXIN B SULFATE, HYDROCORTISONE 3.5; 10000; 1 MG/ML; [USP'U]/ML; MG/ML
SOLUTION/ DROPS AURICULAR (OTIC)
COMMUNITY

## 2022-11-29 RX ORDER — TAMSULOSIN HYDROCHLORIDE 0.4 MG/1
CAPSULE ORAL
COMMUNITY

## 2022-11-29 RX ORDER — ASPIRIN 81 MG
2 TABLET, DELAYED RELEASE (ENTERIC COATED) ORAL
COMMUNITY

## 2022-11-29 RX ORDER — CEFUROXIME AXETIL 250 MG/1
250 TABLET ORAL 2 TIMES DAILY
COMMUNITY
Start: 2022-07-18

## 2022-11-29 RX ORDER — CIPROFLOXACIN 500 MG/1
TABLET ORAL
COMMUNITY

## 2022-11-29 RX ORDER — BETAMETHASONE VALERATE 0.1 %
LOTION (ML) TOPICAL
COMMUNITY

## 2022-11-29 RX ORDER — DOXYCYCLINE 100 MG/1
CAPSULE ORAL
COMMUNITY
End: 2023-11-17 | Stop reason: ALTCHOICE

## 2022-11-29 RX ORDER — SULFAMETHOXAZOLE AND TRIMETHOPRIM 800; 160 MG/1; MG/1
TABLET ORAL
COMMUNITY

## 2022-11-29 RX ORDER — CLINDAMYCIN HYDROCHLORIDE 300 MG/1
CAPSULE ORAL
COMMUNITY

## 2022-11-29 RX ORDER — FINASTERIDE 5 MG/1
5 TABLET, FILM COATED ORAL
COMMUNITY
Start: 2022-10-11

## 2022-11-29 RX ORDER — VITAMIN E 268 MG
400 CAPSULE ORAL
COMMUNITY

## 2022-11-29 RX ORDER — NITROFURANTOIN 25; 75 MG/1; MG/1
CAPSULE ORAL
COMMUNITY

## 2022-11-29 RX ORDER — MELOXICAM 15 MG/1
15 TABLET ORAL
COMMUNITY

## 2022-11-29 RX ORDER — LINACLOTIDE 290 UG/1
290 CAPSULE, GELATIN COATED ORAL EVERY MORNING
COMMUNITY
Start: 2022-11-22

## 2022-11-29 NOTE — PROGRESS NOTES
"Subjective:       Patient ID: Derik Fonseca is a 62 y.o. male.    Vitals:  height is 5' 10" (1.778 m) and weight is 74.8 kg (165 lb). His temperature is 99 °F (37.2 °C). His blood pressure is 97/61 and his pulse is 77. His respiration is 20 and oxygen saturation is 96%.     Chief Complaint: Edema    This is a 62 y.o. male   who presents today with a chief complaint of edema of the left leg that began three day ago. He states that he has discomfort in his groin area. The swelling seems to have started from his ankle/lower leg and come up. He states that he had the same issue with his right leg and it was discovered that he had a hip fracture. He hasn't taken any medication to help relieve his symptoms.     Edema  This is a new problem. The current episode started in the past 7 days. The problem occurs constantly. The problem has been gradually worsening. Pertinent negatives include no abdominal pain, anorexia, arthralgias, change in bowel habit, chest pain, chills, congestion, coughing, diaphoresis, fatigue, fever, headaches, joint swelling, myalgias, nausea, neck pain, numbness, rash, sore throat, swollen glands, urinary symptoms, vertigo, visual change, vomiting or weakness. Nothing aggravates the symptoms. He has tried nothing for the symptoms.     Constitution: Negative for chills, sweating, fatigue and fever.   HENT:  Negative for congestion and sore throat.    Neck: Negative for neck pain.   Cardiovascular:  Negative for chest pain.   Respiratory:  Negative for cough.    Gastrointestinal:  Negative for abdominal pain, nausea and vomiting.   Musculoskeletal:  Negative for joint pain, joint swelling and muscle ache.   Skin:  Negative for rash.   Neurological:  Negative for history of vertigo, headaches and numbness.     Objective:      Physical Exam   Constitutional: He is oriented to person, place, and time. He does not appear ill. No distress.   HENT:   Head: Normocephalic.   Eyes: Conjunctivae are normal. "   Musculoskeletal:      Comments: Sitting comfortably in wheelchair. Diffuse nonpitting edema and swelling of LLE. Non appreciated on the right. No tenderness with bony palpation. Patient reports limited sensation in lower extremities at baseline.    Neurological: He is alert and oriented to person, place, and time.   Skin: Skin is warm and dry.   Psychiatric: His behavior is normal. Mood normal.   Nursing note and vitals reviewed.      Assessment:       1. Pain and swelling of lower extremity, left        EXAMINATION:  XR HIP WITH PELVIS WHEN PERFORMED, 2 OR 3 VIEWS LEFT     CLINICAL HISTORY:  Pain in left leg     TECHNIQUE:  AP view of the pelvis and frog leg lateral view of the left hip were performed.     COMPARISON:  12/13/2019.     FINDINGS:  There is diffuse osteopenia.  There is hardware in the right proximal femur.  The hardware appears intact.  There is no acute fracture or dislocation of the left hip.  Alignment is normal.  The femoral heads are well seated within the acetabula.  There are degenerative changes.     Impression:     No acute osseous abnormality.        Electronically signed by: Yeison Velez  Date:                                            11/29/2022  Time:                                           17:08  Plan:         Pain and swelling of lower extremity, left  -     X-Ray Hip 2 or 3 views Left (with Pelvis when performed); Future; Expected date: 11/29/2022       61 yo male with PMH paraplegia and hx of right femur fracture presents with complaint of 3 days of left hip pain and swelling without injury or trauma. Xray findings discussed. Advised  f/u with PCP or ED eval due to diffuse swelling and concern for possible DVT, other acute abnormality, etc. Patient states he has an appointment with his PCP tomorrow and will request outpatient imaging. No further questions/concerns prior to discharge.

## 2023-01-20 ENCOUNTER — HOSPITAL ENCOUNTER (OUTPATIENT)
Dept: WOUND CARE | Facility: HOSPITAL | Age: 63
Discharge: HOME | End: 2023-01-20
Attending: PREVENTIVE MEDICINE
Payer: MEDICARE

## 2023-01-20 VITALS
BODY MASS INDEX: 23.62 KG/M2 | HEIGHT: 70 IN | SYSTOLIC BLOOD PRESSURE: 115 MMHG | HEART RATE: 92 BPM | TEMPERATURE: 98 F | DIASTOLIC BLOOD PRESSURE: 72 MMHG | WEIGHT: 165 LBS

## 2023-01-20 DIAGNOSIS — L89.324 PRESSURE INJURY OF LEFT ISCHIUM, STAGE 4: Primary | ICD-10-CM

## 2023-01-20 DIAGNOSIS — G82.20 PARAPLEGIA: ICD-10-CM

## 2023-01-20 DIAGNOSIS — L89.312: ICD-10-CM

## 2023-01-20 DIAGNOSIS — Z98.890 S/P FLAP GRAFT: ICD-10-CM

## 2023-01-20 PROCEDURE — 99204 OFFICE O/P NEW MOD 45 MIN: CPT

## 2023-01-20 NOTE — PROGRESS NOTES
[facility]    Subjective:       Patient ID: Derik Fonseca is a 62 y.o. male.    Chief Complaint: Non-healing Wound and Pressure Ulcer    Patient seen for left ischial pressure ulcer.  Wound appears to epithelialized.  No acute complaints today.  Review of Systems      Objective:     Vitals:    01/20/23 1327   BP: 115/72   Pulse: 92   Temp: 97.5 °F (36.4 °C)         Physical Exam       Altered Skin Integrity 06/04/21 1317 Left Buttocks Ulceration Full thickness tissue loss. Subcutaneous fat may be visible but bone, tendon or muscle are not exposed (Active)   06/04/21 1317   Altered Skin Integrity Present on Admission:    Side: Left   Orientation:    Location: Buttocks   Wound Number:    Is this injury device related?: No   Primary Wound Type: Ulceration   Description of Altered Skin Integrity: Full thickness tissue loss. Subcutaneous fat may be visible but bone, tendon or muscle are not exposed   Ankle-Brachial Index:    Pulses:    Removal Indication and Assessment:    Wound Outcome:    (Retired) Wound Length (cm):    (Retired) Wound Width (cm):    (Retired) Depth (cm):    Wound Description (Comments):    Removal Indications:    Wound Image   01/20/23 1346   Dressing Appearance Dry 01/20/23 1346   Drainage Amount None 01/20/23 1346   Wound Length (cm) 0 cm 01/20/23 1346   Wound Width (cm) 0 cm 01/20/23 1346   Wound Depth (cm) 0 cm 01/20/23 1346   Wound Volume (cm^3) 0 cm^3 01/20/23 1346   Wound Surface Area (cm^2) 0 cm^2 01/20/23 1346   Dressing Applied;Foam 01/20/23 1346       [REMOVED]      (Retired) Wound 12/03/21 1300 Ulceration Right Ischial tuberosity (Removed)   12/03/21 1300    Pre-existing: Yes   Primary Wound Type: Ulceration   Side: Right   Orientation:    Location: Ischial tuberosity   Wound Number:    Ankle-Brachial Index:    Pulses:    Removal Indication and Assessment:    Wound Outcome: Healed   (Retired) Wound Type:    (Retired) Wound Length (cm):     (Retired) Wound Width (cm):    (Retired) Depth (cm):    Wound Description (Comments):    Removal Indications:    Removed 01/20/23 1347   Wound Image   01/20/23 1346   Dressing Appearance Intact 01/20/23 1346   Drainage Amount None 01/20/23 1346   Appearance Closed/resurfaced 01/20/23 1346         Assessment/Plan:         ICD-10-CM ICD-9-CM   1. Paraplegia  G82.20 344.1   2. Pressure injury of left ischium, stage 4  L89.324 707.05     707.24   3. S/P flap graft  Z98.890 V45.89       Silver Nitrate x 1 used.  Tolerated procedure and dressing application well. Patient to care for his wound at home.    Tissue pathology and/or culture taken:  [] Yes [x] No   Sharp debridement performed:   [] Yes [x] No   Labs ordered this visit:   [] Yes [x] No   Imaging ordered this visit:   [] Yes [x] No           Orders Placed This Encounter   Procedures    Change dressing     Healed Wound Instructions:Your wound is healed, it is extremely fragile at this stage; protect it from friction, wash it gently and pat dry.  Continue to apply foam dressing for protection to both ischial areas.If the wound should re-open, please call [phone] for further instructions.

## 2023-11-17 ENCOUNTER — HOSPITAL ENCOUNTER (OUTPATIENT)
Dept: WOUND CARE | Facility: HOSPITAL | Age: 63
Discharge: HOME | End: 2023-11-17
Attending: PREVENTIVE MEDICINE
Payer: MEDICARE

## 2023-11-17 VITALS
HEIGHT: 70 IN | TEMPERATURE: 98 F | BODY MASS INDEX: 23.62 KG/M2 | WEIGHT: 165 LBS | HEART RATE: 94 BPM | SYSTOLIC BLOOD PRESSURE: 109 MMHG | DIASTOLIC BLOOD PRESSURE: 66 MMHG

## 2023-11-17 DIAGNOSIS — G82.20 PARAPLEGIA: ICD-10-CM

## 2023-11-17 DIAGNOSIS — L89.893 PRESSURE INJURY OF RIGHT KNEE, STAGE 3: Primary | ICD-10-CM

## 2023-11-17 PROCEDURE — 99203 OFFICE O/P NEW LOW 30 MIN: CPT

## 2023-11-17 RX ORDER — DOXYCYCLINE HYCLATE 100 MG
100 TABLET ORAL EVERY 12 HOURS
Qty: 20 TABLET | Refills: 0 | Status: SHIPPED | OUTPATIENT
Start: 2023-11-17 | End: 2023-11-27

## 2023-11-17 RX ORDER — NYSTATIN 100000 [USP'U]/G
POWDER TOPICAL 2 TIMES DAILY
Qty: 60 G | Refills: 3 | Status: SHIPPED | OUTPATIENT
Start: 2023-11-17

## 2023-11-17 NOTE — PROGRESS NOTES
[facility]    Subjective:       Patient ID: Derik Fonseca is a 63 y.o. male.    Chief Complaint: Pressure Ulcer    Patient seen for right lateral knee. Stated wound opened up last week.  Plan of care discussed.      Review of Systems   All other systems reviewed and are negative.        Objective:     Vitals:    11/17/23 1320   BP: 109/66   Pulse: 94   Temp: 98 °F (36.7 °C)         Physical Exam       Altered Skin Integrity 11/17/23 1441 Right lateral Knee Full thickness tissue loss. Subcutaneous fat may be visible but bone, tendon or muscle are not exposed (Active)   11/17/23 1441   Altered Skin Integrity Present on Admission - Did Patient arrive to the hospital with altered skin?: yes   Side: Right   Orientation: lateral   Location: Knee   Wound Number:    Is this injury device related?: No   Primary Wound Type:    Description of Altered Skin Integrity: Full thickness tissue loss. Subcutaneous fat may be visible but bone, tendon or muscle are not exposed   Ankle-Brachial Index:    Pulses:    Removal Indication and Assessment:    Wound Outcome:    (Retired) Wound Length (cm):    (Retired) Wound Width (cm):    (Retired) Depth (cm):    Wound Description (Comments):    Removal Indications:    Wound Image   11/17/23 1441   Description of Altered Skin Integrity Full thickness tissue loss. Subcutaneous fat may be visible but bone, tendon or muscle are not exposed 11/17/23 1441   Dressing Appearance Moist drainage 11/17/23 1441   Drainage Amount Large 11/17/23 1441   Drainage Characteristics/Odor Serosanguineous 11/17/23 1441   Appearance Red;Fibrin;Yellow 11/17/23 1441   Tissue loss description Full thickness 11/17/23 1441   Red (%), Wound Tissue Color 60 % 11/17/23 1441   Yellow (%), Wound Tissue Color 40 % 11/17/23 1441   Periwound Area Redness;Intact;Dry 11/17/23 1441   Wound Edges Defined;Open 11/17/23 1441   Wound Length (cm) 2.2 cm 11/17/23 1441   Wound Width (cm) 1  cm 11/17/23 1441   Wound Depth (cm) 0.1 cm 11/17/23 1441   Wound Volume (cm^3) 0.22 cm^3 11/17/23 1441   Wound Surface Area (cm^2) 2.2 cm^2 11/17/23 1441   Care Cleansed with:;Sterile normal saline 11/17/23 1441   Dressing Applied;Collagen;Hydrofiber;Foam 11/17/23 1441   Dressing Change Due 11/20/23 11/17/23 1441         Assessment/Plan:         ICD-10-CM ICD-9-CM   1. Pressure injury of right knee, stage 3  L89.893 707.09     707.23   2. Paraplegia  G82.20 344.1           Tissue pathology and/or culture taken:  [] Yes [x] No   Sharp debridement performed:   [] Yes [x] No   Labs ordered this visit:   [] Yes [x] No   Imaging ordered this visit:   [] Yes [x] No           Orders Placed This Encounter   Procedures    Change dressing     Right lateral knee    Cleanse wound with: NS  Lidocaine:prn  Silver nitrate:prn  Periwound care:prn  Primary dressing: Gisele+Hydrofera Ready  Secondary dressing: foam border   Offloading: re-adjust w/c    Frequency:2 x week +prn  Follow-up:prn        Follow up if symptoms worsen or fail to improve, for Dr Stone.            This includes face to face time and non-face to face time preparing to see the patient (eg, review of tests), obtaining and/or reviewing separately obtained history, documenting clinical information in the electronic or other health record, independently interpreting results and communicating results to the patient/family/caregiver, or care coordinator.

## 2024-03-07 RX ORDER — MUPIROCIN 20 MG/G
OINTMENT TOPICAL DAILY
Qty: 22 G | Refills: 2 | Status: SHIPPED | OUTPATIENT
Start: 2024-03-07

## 2024-05-17 ENCOUNTER — HOSPITAL ENCOUNTER (OUTPATIENT)
Dept: WOUND CARE | Facility: HOSPITAL | Age: 64
Discharge: HOME | End: 2024-05-17
Attending: PREVENTIVE MEDICINE
Payer: MEDICARE

## 2024-05-17 VITALS
DIASTOLIC BLOOD PRESSURE: 58 MMHG | RESPIRATION RATE: 18 BRPM | HEART RATE: 71 BPM | TEMPERATURE: 99 F | SYSTOLIC BLOOD PRESSURE: 104 MMHG

## 2024-05-17 DIAGNOSIS — L89.323: Primary | ICD-10-CM

## 2024-05-17 DIAGNOSIS — G82.20 PARAPLEGIA: ICD-10-CM

## 2024-05-17 PROCEDURE — 99203 OFFICE O/P NEW LOW 30 MIN: CPT | Mod: 25

## 2024-05-17 PROCEDURE — 11042 DBRDMT SUBQ TIS 1ST 20SQCM/<: CPT

## 2024-05-17 NOTE — PROGRESS NOTES
"                     [facility]    Subjective:       Patient ID: Derik Fonseca is a 63 y.o. male.    Chief Complaint: Pressure Ulcer    Readmit to wound care clinic for left ischial pressure ulcer. Patient reports area opened after several "difficult transfers." Patient noted with stage 3 ulcer with undermining. Area debrided. Patient has Roho cushion and low air mattress, caregivers assisting with care and turning. Will obtain authorization for skin substitute. Return to clinic in 2 weeks.     Review of Systems   All other systems reviewed and are negative.        Objective:     Vitals:    05/17/24 1358   BP: (!) 104/58   Pulse: 71   Resp: 18   Temp: 98.6 °F (37 °C)         Physical Exam       Wound 05/17/24 1409 Pressure Injury Left Ischial tuberosity (Active)   05/17/24 1409   Present on Original Admission: Y   Primary Wound Type: Pressure inj   Side: Left   Orientation:    Location: Ischial tuberosity   Wound Approximate Age at First Assessment (Weeks):    Wound Number:    Is this injury device related?:    Incision Type:    Closure Method:    Wound Description (Comments):    Type:    Additional Comments:    Ankle-Brachial Index:    Pulses:    Removal Indication and Assessment:    Wound Outcome:    Wound Image   05/17/24 1409   Pressure Injury Stage 3 05/17/24 1409   Dressing Appearance Moist drainage 05/17/24 1409   Drainage Amount Moderate 05/17/24 1409   Drainage Characteristics/Odor Serosanguineous 05/17/24 1409   Tissue loss description Full thickness 05/17/24 1409   Red (%), Wound Tissue Color 25 % 05/17/24 1409   Periwound Area Moist 05/17/24 1409   Wound Edges Undefined 05/17/24 1409   Wound Length (cm) 0.5 cm 05/17/24 1409   Wound Width (cm) 0.5 cm 05/17/24 1409   Wound Depth (cm) 1.5 cm 05/17/24 1409   Wound Volume (cm^3) 0.375 cm^3 05/17/24 1409   Wound Surface Area (cm^2) 0.25 cm^2 05/17/24 1409   Undermining (depth (cm)/location) 12-12:00 = 1.5 05/17/24 1409   Care " Cleansed with:;Sterile normal saline;Debrided 05/17/24 1409   Dressing Applied;Silver;Collagen;Methylene blue/gentian violet;Foam;Island/border 05/17/24 1409   Off Loading Other (see comments) 05/17/24 1409   Dressing Change Due 05/19/24 05/17/24 1409         Assessment/Plan:         ICD-10-CM ICD-9-CM   1. Left ischial pressure sore, stage III  L89.323 707.05     707.23   2. Paraplegia  G82.20 344.1       New wound to left ischium. No signs of infection at this time.    Tissue pathology and/or culture taken:  [] Yes [x] No   Sharp debridement performed:   [x] Yes [] No   Labs ordered this visit:   [] Yes [x] No   Imaging ordered this visit:   [] Yes [x] No           Orders Placed This Encounter   Procedures    Change dressing     Left ischial pressure ulcer:   Cleanse wound with:saline  Lidocaine:prn  Silver nitrate:prn  Periwound care:maintain dry  Primary dressing:Gisele packed into wound, hydrofera blue ready  Secondary dressing:mepilex border  Offloading:Roho cushion  Frequency:every other day  Follow-up:2 weeks  Other Orders:obtain authorization for [facility]        Follow up in about 2 weeks (around 5/31/2024) for Dr. Stone.            This includes face to face time and non-face to face time preparing to see the patient (eg, review of tests), obtaining and/or reviewing separately obtained history, documenting clinical information in the electronic or other health record, independently interpreting results and communicating results to the patient/family/caregiver, or care coordinator.

## 2024-05-17 NOTE — PROCEDURES
"Debridement    Date/Time: 5/17/2024 1:50 PM    Performed by: Ugo Stone MD  Authorized by: Ugo Stone MD    Time out: Immediately prior to procedure a "time out" was called to verify the correct patient, procedure, equipment, support staff and site/side marked as required.    Consent Done?:  Yes (Written)  Local anesthesia used?: Yes    Local anesthetic:  Topical anesthetic    Wound Details:    Location:  Left buttock    Type of Debridement:  Excisional       Length (cm):  0.5       Area (sq cm):  0.25       Width (cm):  0.5       Percent Debrided (%):  100       Depth (cm):  1.5       Total Area Debrided (sq cm):  0.25    Depth of debridement:  Subcutaneous tissue    Tissue debrided:  Subcutaneous    Devitalized tissue debrided:  Slough, Fibrin and Exudate    Instruments:  Curette  Bleeding:  Minimal  Hemostasis Achieved: Yes  Method Used:  Pressure  Patient tolerance:  Patient tolerated the procedure well with no immediate complications    "

## 2024-05-31 ENCOUNTER — HOSPITAL ENCOUNTER (OUTPATIENT)
Dept: WOUND CARE | Facility: HOSPITAL | Age: 64
Discharge: HOME | End: 2024-05-31
Attending: PREVENTIVE MEDICINE
Payer: MEDICARE

## 2024-05-31 VITALS
TEMPERATURE: 98 F | HEIGHT: 70 IN | SYSTOLIC BLOOD PRESSURE: 156 MMHG | HEART RATE: 68 BPM | DIASTOLIC BLOOD PRESSURE: 87 MMHG | BODY MASS INDEX: 23.6 KG/M2 | WEIGHT: 164.88 LBS

## 2024-05-31 DIAGNOSIS — G82.20 PARAPLEGIA: ICD-10-CM

## 2024-05-31 DIAGNOSIS — L89.323: Primary | ICD-10-CM

## 2024-05-31 PROCEDURE — 15271 SKIN SUB GRAFT TRNK/ARM/LEG: CPT

## 2024-05-31 NOTE — PROGRESS NOTES
[facility]    Subjective:       Patient ID: Derik Fonseca is a 63 y.o. male.    Chief Complaint: Wound Care and Wound Check     Follow up related to left ischial pressure injury.  First Nushield graft placed, Product [id number] NuShield 2x3cm, donor # [id number], ID [id number], EXP 09/15/2027. Hydrated with normal saline lot [id number], exp 2026-01-01. Plan of care updated.     Review of Systems   All other systems reviewed and are negative.        Objective:     Vitals:    05/31/24 1313   BP: (!) 156/87   Pulse: 68   Temp: 97.9 °F (36.6 °C)         Physical Exam       Wound 05/17/24 1409 Pressure Injury Left Ischial tuberosity (Active)   05/17/24 1409   Present on Original Admission: Y   Primary Wound Type: Pressure inj   Side: Left   Orientation:    Location: Ischial tuberosity   Wound Approximate Age at First Assessment (Weeks):    Wound Number:    Is this injury device related?:    Incision Type:    Closure Method:    Wound Description (Comments):    Type:    Additional Comments:    Ankle-Brachial Index:    Pulses:    Removal Indication and Assessment:    Wound Outcome:    Wound Image    05/31/24 1300   Pressure Injury Stage 3 05/31/24 1300   Dressing Appearance Intact;Moist drainage 05/31/24 1300   Drainage Amount Moderate 05/31/24 1300   Drainage Characteristics/Odor Serosanguineous 05/31/24 1300   Tissue loss description Full thickness 05/31/24 1300   Red (%), Wound Tissue Color 25 % 05/31/24 1300   Periwound Area Moist;[place] 05/31/24 1300   Wound Edges Jagged 05/31/24 1300   Wound Length (cm) 0.6 cm 05/31/24 1300   Wound Width (cm) 0.2 cm 05/31/24 1300   Wound Depth (cm) 1.5 cm 05/31/24 1300   Wound Volume (cm^3) 0.18 cm^3 05/31/24 1300   Wound Surface Area (cm^2) 0.12 cm^2 05/31/24 1300   Undermining (depth (cm)/location) 1.5cm@12-12 05/31/24 1300   Care Cleansed with:;Sterile normal saline 05/31/24 1300   Dressing Applied;Gauze;Island/border 05/31/24 1300   Packing  packed with;hydrofiber/alginate 05/31/24 1300   Packing Inserted  2 05/31/24 1300   Packing Removed 1 05/31/24 1300   Periwound Care Absorptive dressing applied;Dry periwound area maintained 05/31/24 1300   Off Loading Other (see comments) 05/31/24 1300   Dressing Change Due 06/07/24 05/31/24 1300     Wound Location: left ischial  Skin Substitute: nushield  Performed By: Ugo Stone  Time-out Taken: Yes  Location:     [] Genitalia/Hands/Feet/Multiple Digits    [] Scalp/Face/Neck/Ears    [x]Trunk/Arms/Legs  Application Area: (sq cm): 3   Product Applied: (sq cm): 6  Product Waste (sq cm): 0  Fenestrated: No     Dressing Applied: Yes  Response to Treatment:Well tolerated by patient.       Assessment/Plan:         ICD-10-CM ICD-9-CM   1. Left ischial pressure sore, stage III  L89.323 707.05     707.23   2. Paraplegia  G82.20 344.1           Tissue pathology and/or culture taken:  [] Yes [x] No   Sharp debridement performed:   [] Yes [x] No   Labs ordered this visit:   [] Yes [x] No   Imaging ordered this visit:   [] Yes [x] No           Orders Placed This Encounter   Procedures    Change dressing     Left ischial pressure ulcer:  Cleanse wound with: normal saline  Lidocaine:prn  Silver nitrate:prn  Periwound care: maintain dry  Primary dressing: Ghanshyam graft #1 5/31/24, packed with calcium alginate rope  Secondary dressing: gauze, then mepilex border  Offloading: Roho cushion  Frequency: weekly, ok for patient to change calcium alginate rope and secondary dressing as needed, graft to be left in place  Follow-up: 1 week with Dr Stone  Other Orders: Supplies requested for at home dressing change, plan for Nushsukhwinder graft #2        Follow up in 1 week (on 6/7/2024) for Dr Stone.            This includes face to face time and non-face to face time preparing to see the patient (eg, review of tests), obtaining and/or reviewing separately obtained history, documenting clinical information in the electronic or other  health record, independently interpreting results and communicating results to the patient/family/caregiver, or care coordinator.

## 2024-06-07 ENCOUNTER — HOSPITAL ENCOUNTER (OUTPATIENT)
Dept: WOUND CARE | Facility: HOSPITAL | Age: 64
Discharge: HOME | End: 2024-06-07
Attending: PREVENTIVE MEDICINE
Payer: MEDICARE

## 2024-06-07 DIAGNOSIS — L89.323: Primary | ICD-10-CM

## 2024-06-07 DIAGNOSIS — G82.20 PARAPLEGIA: ICD-10-CM

## 2024-06-07 PROCEDURE — 15271 SKIN SUB GRAFT TRNK/ARM/LEG: CPT

## 2024-06-07 NOTE — PROGRESS NOTES
[facility]    Subjective:       Patient ID: Derik Fonseca is a 63 y.o. male.    Chief Complaint: Non-healing Wound    Follow up for left ischial pressure ulcer that is improving. Applied NuShield #2 1.6 cm Disc.Product Code No-1160c. Donor ID [id number]. [facility] [place]#[id number].  ID # [id number] Exp 08/21/2028.  No saline used. Continue with the same plan of care.       Review of Systems   All other systems reviewed and are negative.        Objective:         Physical Exam       Wound 05/17/24 1409 Pressure Injury Left Ischial tuberosity (Active)   05/17/24 1409   Present on Original Admission: Y   Primary Wound Type: Pressure inj   Side: Left   Orientation:    Location: Ischial tuberosity   Wound Approximate Age at First Assessment (Weeks):    Wound Number:    Is this injury device related?:    Incision Type:    Closure Method:    Wound Description (Comments):    Type:    Additional Comments:    Ankle-Brachial Index:    Pulses:    Removal Indication and Assessment:    Wound Outcome:    Wound Image   06/07/24 1442   Pressure Injury Stage 3 06/07/24 1442   Dressing Appearance Intact;Moist drainage 06/07/24 1442   Drainage Amount Small 06/07/24 1442   Drainage Characteristics/Odor Serosanguineous 06/07/24 1442   Tissue loss description Full thickness 06/07/24 1442   Red (%), Wound Tissue Color 100 % 06/07/24 1442   Periwound Area Intact;Moist 06/07/24 1442   Wound Edges Jagged 06/07/24 1442   Wound Length (cm) 0.5 cm 06/07/24 1442   Wound Width (cm) 0.2 cm 06/07/24 1442   Wound Depth (cm) 1.3 cm 06/07/24 1442   Wound Volume (cm^3) 0.13 cm^3 06/07/24 1442   Wound Surface Area (cm^2) 0.1 cm^2 06/07/24 1442   Undermining (depth (cm)/location) 1.3 @ 1-9 06/07/24 1442   Care Cleansed with:;Sterile normal saline 06/07/24 1442   Dressing Applied;Changed;Calcium alginate;Foam;Island/border;Other (comment) 06/07/24 1442   Packing packing removed;hydrofiber/alginate 06/07/24 1901    Packing Inserted  1 06/07/24 1442   Packing Removed 1 06/07/24 1442   Periwound Care Absorptive dressing applied 06/07/24 1442   Off Loading Other (see comments) 06/07/24 1442   Dressing Change Due 06/21/24 06/07/24 1442       Wound Location: left ischium  Skin Substitute: nusheild  Performed By: Ugo Stone  Time-out Taken: Yes  Location:     [] Genitalia/Hands/Feet/Multiple Digits    [] Scalp/Face/Neck/Ears    [x]Trunk/Arms/Legs  Application Area: (sq cm): 1   Product Applied: (sq cm): 1.6  Product Waste (sq cm): 0  Fenestrated: No    Secured with: Steri-strips and contact layer  Dressing Applied: Yes  Response to Treatment:Well tolerated by patient.    Assessment/Plan:         ICD-10-CM ICD-9-CM   1. Left ischial pressure sore, stage III  L89.323 707.05     707.23   2. Paraplegia  G82.20 344.1           Tissue pathology and/or culture taken:  [] Yes [x] No   Sharp debridement performed:   [] Yes [x] No   Labs ordered this visit:   [] Yes [x] No   Imaging ordered this visit:   [] Yes [x] No           Orders Placed This Encounter   Procedures    Change dressing     Left ischial pressure ulcer:   Cleanse wound with: normal saline   Lidocaine:prn   Silver nitrate:prn   Periwound care: maintain dry   Primary dressing: Nushield graft #2,  packed with calcium alginate rope (in the clinic)  Secondary dressing: gauze, then mepilex border   Offloading: Roho cushion   Frequency: weekly, ok for patient to change Promogran ( at home) and secondary dressing as needed, graft to be left in place   Follow-up: 1 week with Dr Stone   Other Orders: Promogran requested for at home dressing change.    Plan for Nushield graft #3        Follow up in about 2 weeks (around 6/21/2024).            This includes face to face time and non-face to face time preparing to see the patient (eg, review of tests), obtaining and/or reviewing separately obtained history, documenting clinical information in the electronic or other health record,  independently interpreting results and communicating results to the patient/family/caregiver, or care coordinator.

## 2024-06-21 ENCOUNTER — HOSPITAL ENCOUNTER (OUTPATIENT)
Dept: WOUND CARE | Facility: HOSPITAL | Age: 64
Discharge: HOME | End: 2024-06-21
Attending: PREVENTIVE MEDICINE
Payer: MEDICARE

## 2024-06-21 VITALS
HEART RATE: 96 BPM | RESPIRATION RATE: 18 BRPM | SYSTOLIC BLOOD PRESSURE: 96 MMHG | HEIGHT: 70 IN | DIASTOLIC BLOOD PRESSURE: 52 MMHG | TEMPERATURE: 99 F | BODY MASS INDEX: 23.6 KG/M2 | WEIGHT: 164.88 LBS

## 2024-06-21 DIAGNOSIS — G82.20 PARAPLEGIA: ICD-10-CM

## 2024-06-21 DIAGNOSIS — L89.323: Primary | ICD-10-CM

## 2024-06-21 PROCEDURE — 15271 SKIN SUB GRAFT TRNK/ARM/LEG: CPT

## 2024-06-21 NOTE — PROGRESS NOTES
[facility]    Subjective:       Patient ID: Derik Fonseca is a 63 y.o. male.    Chief Complaint: Pressure Ulcer    Wound care follow up for left ischial ulcer. Wound undermining li. Nushield 1.6 cm disc application #3 applied - EXP: 08/21/2028, Donor ID: [id number], ID#[id number]. No saline used. Return to clinic in 1 week.   Review of Systems      Objective:     Vitals:    06/21/24 1319   BP: (!) 96/52   Pulse: 96   Resp: 18   Temp: 98.8 °F (37.1 °C)         Physical Exam       Wound 05/17/24 1409 Pressure Injury Left Ischial tuberosity (Active)   05/17/24 1409   Present on Original Admission: Y   Primary Wound Type: Pressure inj   Side: Left   Orientation:    Location: Ischial tuberosity   Wound Approximate Age at First Assessment (Weeks):    Wound Number:    Is this injury device related?:    Incision Type:    Closure Method:    Wound Description (Comments):    Type:    Additional Comments:    Ankle-Brachial Index:    Pulses:    Removal Indication and Assessment:    Wound Outcome:    Wound Image   06/21/24 1336   Pressure Injury Stage 3 06/21/24 1336   Dressing Appearance Moist drainage 06/21/24 1336   Drainage Amount Moderate 06/21/24 1336   Drainage Characteristics/Odor Serosanguineous 06/21/24 1336   Tissue loss description Full thickness 06/21/24 1336   Periwound Area Intact;Redness 06/21/24 1336   Wound Edges Undefined 06/21/24 1336   Wound Length (cm) 0.4 cm 06/21/24 1336   Wound Width (cm) 0.2 cm 06/21/24 1336   Wound Depth (cm) 1.3 cm 06/21/24 1336   Wound Volume (cm^3) 0.104 cm^3 06/21/24 1336   Wound Surface Area (cm^2) 0.08 cm^2 06/21/24 1336   Undermining (depth (cm)/location) 12-12:00 = 1.0cm 06/21/24 1336   Care Cleansed with:;Sterile normal saline 06/21/24 1336   Dressing Applied;Other (comment);Calcium alginate;Gauze;Island/border 06/21/24 1336   Packing packed with;hydrofiber/alginate 06/21/24 1336   Packing Inserted  1 06/21/24  1336   Periwound Care Absorptive dressing applied 06/21/24 1336   Off Loading Other (see comments) 06/21/24 1336   Dressing Change Due 06/28/24 06/21/24 1336         Assessment/Plan:         ICD-10-CM ICD-9-CM   1. Left ischial pressure sore, stage III  L89.323 707.05     707.23           Tissue pathology and/or culture taken:  [] Yes [x] No   Sharp debridement performed:   [] Yes [x] No   Labs ordered this visit:   [] Yes [x] No   Imaging ordered this visit:   [] Yes [x] No           Orders Placed This Encounter   Procedures    Change dressing     Left ischial pressure ulcer:   Cleanse wound with: normal saline   Lidocaine:prn   Silver nitrate:prn   Periwound care: maintain dry   Primary dressing: Nushield graft #3,  packed with calcium alginate rope  Secondary dressing: gauze, then mepilex border   Offloading: Roho cushion   Frequency: weekly, ok for patient to change Promogran ( at home) and secondary dressing as needed, graft to be left in place   Follow-up: 1 week with Dr Stone   Other Orders:  Plan for Nushield graft #4 next week        Follow up in about 1 week (around 6/28/2024) for .            This includes face to face time and non-face to face time preparing to see the patient (eg, review of tests), obtaining and/or reviewing separately obtained history, documenting clinical information in the electronic or other health record, independently interpreting results and communicating results to the patient/family/caregiver, or care coordinator.       clinical information in the electronic or other health record, independently interpreting results and communicating results to the patient/family/caregiver, or care coordinator.

## 2024-07-12 ENCOUNTER — HOSPITAL ENCOUNTER (OUTPATIENT)
Dept: WOUND CARE | Facility: HOSPITAL | Age: 64
Discharge: HOME | End: 2024-07-12
Attending: PREVENTIVE MEDICINE
Payer: MEDICARE

## 2024-07-12 VITALS
HEART RATE: 86 BPM | DIASTOLIC BLOOD PRESSURE: 87 MMHG | HEIGHT: 70 IN | WEIGHT: 164 LBS | BODY MASS INDEX: 23.48 KG/M2 | SYSTOLIC BLOOD PRESSURE: 160 MMHG

## 2024-07-12 DIAGNOSIS — L89.323: Primary | ICD-10-CM

## 2024-07-12 PROCEDURE — 15271 SKIN SUB GRAFT TRNK/ARM/LEG: CPT

## 2024-07-12 NOTE — PROGRESS NOTES
Wound Care & Hyperbar[facility] Medicine    Subjective:       Patient ID: Derik Fonseca is a 63 y.o. male.    Chief Complaint: Non-healing Wound Follow Up    Follow up for left ischial pressure ulcer that is improving. Applied NuShield #4 1.6 cm Disc.Product Code No-1160c. Donor ID [id number]. [facility] [place]#[id number].  ID # [id number]  Exp 11/06/28  No saline used. Continue with the same plan of care.        Review of Systems      Objective:     Vitals:    07/12/24 1402   BP: (!) 160/87   Pulse: 86         Physical Exam       Wound 05/17/24 1409 Pressure Injury Left Ischial tuberosity (Active)   05/17/24 1409   Present on Original Admission: Y   Primary Wound Type: Pressure inj   Side: Left   Orientation:    Location: Ischial tuberosity   Wound Approximate Age at First Assessment (Weeks):    Wound Number:    Is this injury device related?:    Incision Type:    Closure Method:    Wound Description (Comments):    Type:    Additional Comments:    Ankle-Brachial Index:    Pulses:    Removal Indication and Assessment:    Wound Outcome:    Wound Image   07/12/24 1529   Dressing Appearance Intact;Moist drainage 07/12/24 1529   Drainage Amount Moderate 07/12/24 1529   Drainage Characteristics/Odor Serosanguineous 07/12/24 1529   Appearance Intact;Pink 07/12/24 1529   Tissue loss description Full thickness 07/12/24 1529   Red (%), Wound Tissue Color 100 % 07/12/24 1529   Periwound Area Intact;Redness 07/12/24 1529   Wound Edges Undefined 07/12/24 1529   Wound Length (cm) 0.3 cm 07/12/24 1529   Wound Width (cm) 0.2 cm 07/12/24 1529   Wound Depth (cm) 1 cm 07/12/24 1529   Wound Volume (cm^3) 0.06 cm^3 07/12/24 1529   Wound Surface Area (cm^2) 0.06 cm^2 07/12/24 1529   Undermining (depth (cm)/location) 0.6 @12-12 07/12/24 1529   Care Cleansed with:;Antimicrobial agent;Soap and water;Debrided 07/12/24 1529   Dressing Applied;Changed;Calcium alginate;Gauze;Island/border 07/12/24 1529   Packing hydrofiber/alginate  07/12/24 1529   Packing Inserted  1 07/12/24 1529   Periwound Care Absorptive dressing applied;Skin barrier film applied 07/12/24 1529   Off Loading Other (see comments) 07/12/24 1529   Dressing Change Due 07/16/24 07/12/24 1529         Assessment/Plan:         ICD-10-CM ICD-9-CM   1. Left ischial pressure sore, stage III  L89.323 707.05     707.23           Tissue pathology and/or culture taken:  [] Yes [x] No   Sharp debridement performed:   [x] Yes [] No   Labs ordered this visit:   [] Yes [x] No   Imaging ordered this visit:   [] Yes [x] No           Orders Placed This Encounter   Procedures    Change dressing     Left ischial pressure ulcer:  Cleanse wound with: normal saline  Lidocaine:prn  Silver nitrate:prn  Periwound care: maintain dry  Primary dressing: Nushield graft #4,  packed with calcium alginate rope  Secondary dressing: gauze, then mepilex border  Offloading: Roho cushion  Frequency: weekly, ok for patient to change Promogran ( at home) and secondary dressing as needed, graft to be left in place  Follow-up: 1 week with Dr Stone  Other Orders:  Plan for Nushield graft #5 next week        Follow up in about 1 week (around 7/19/2024).            This includes face to face time and non-face to face time preparing to see the patient (eg, review of tests), obtaining and/or reviewing separately obtained history, documenting clinical information in the electronic or other health record, independently interpreting results and communicating results to the patient/family/caregiver, or care coordinator.         information in the electronic or other health record, independently interpreting results and communicating results to the patient/family/caregiver, or care coordinator.

## 2024-07-26 ENCOUNTER — HOSPITAL ENCOUNTER (OUTPATIENT)
Dept: WOUND CARE | Facility: HOSPITAL | Age: 64
Discharge: HOME | End: 2024-07-26
Attending: PREVENTIVE MEDICINE
Payer: MEDICARE

## 2024-07-26 VITALS
WEIGHT: 164 LBS | HEIGHT: 70 IN | SYSTOLIC BLOOD PRESSURE: 111 MMHG | TEMPERATURE: 99 F | DIASTOLIC BLOOD PRESSURE: 59 MMHG | BODY MASS INDEX: 23.48 KG/M2 | HEART RATE: 85 BPM

## 2024-07-26 DIAGNOSIS — L89.304 DECUBITUS ULCER OF ISCHIAL AREA, STAGE 4, UNSPECIFIED LATERALITY: ICD-10-CM

## 2024-07-26 DIAGNOSIS — G82.20 PARAPLEGIA: ICD-10-CM

## 2024-07-26 DIAGNOSIS — L89.324 PRESSURE INJURY OF LEFT ISCHIUM, STAGE 4: Primary | ICD-10-CM

## 2024-07-26 PROCEDURE — 15271 SKIN SUB GRAFT TRNK/ARM/LEG: CPT

## 2024-07-26 NOTE — PROGRESS NOTES
Wound Care & Hyperbaric Medicine    Subjective:       Patient ID: Derik Fonseca is a 64 y.o. male.    Chief Complaint: Pressure Ulcer    Follow up on left ischial pressure injury .  NuShield 1.6 cm disc ID# [id number] exp 04/22/2029 product NO-1160 c-NuShield 1.6 cm disc applied.  No NS used.  Continue with current plan of care.  Review of Systems      Objective:     Vitals:    07/26/24 1319   BP: (!) 111/59   Pulse: 85   Temp: 98.5 °F (36.9 °C)         Physical Exam       Wound 05/17/24 1409 Pressure Injury Left Ischial tuberosity (Active)   05/17/24 1409   Present on Original Admission: Y   Primary Wound Type: Pressure inj   Side: Left   Orientation:    Location: Ischial tuberosity   Wound Approximate Age at First Assessment (Weeks):    Wound Number:    Is this injury device related?:    Incision Type:    Closure Method:    Wound Description (Comments):    Type:    Additional Comments:    Ankle-Brachial Index:    Pulses:    Removal Indication and Assessment:    Wound Outcome:    Wound Image   07/26/24 1323   Pressure Injury Stage 4 07/26/24 1323   Dressing Appearance Moist drainage 07/26/24 1323   Drainage Amount Small 07/26/24 1323   Drainage Characteristics/Odor Serous 07/26/24 1323   Appearance Red;Bone 07/26/24 1323   Tissue loss description Full thickness 07/26/24 1323   Red (%), Wound Tissue Color 100 % 07/26/24 1323   Periwound Area Intact;Redness 07/26/24 1323   Wound Edges Open 07/26/24 1323   Wound Length (cm) 0.5 cm 07/26/24 1323   Wound Width (cm) 0.2 cm 07/26/24 1323   Wound Depth (cm) 1.2 cm 07/26/24 1323   Wound Volume (cm^3) 0.12 cm^3 07/26/24 1323   Wound Surface Area (cm^2) 0.1 cm^2 07/26/24 1323   Undermining (depth (cm)/location) 12-5=1.8 07/26/24 1323   Care Cleansed with:;Sterile normal saline 07/26/24 1323   Dressing Applied;Foam 07/26/24 1323   Dressing Change Due 08/02/24 07/26/24 1323         Assessment/Plan:         ICD-10-CM ICD-9-CM   1. Paraplegia  G82.20 344.1    2. Pressure injury of left ischium, stage 4  L89.324 707.05     707.24   3. Decubitus ulcer of ischial area, stage 4, unspecified laterality  L89.304 707.05     707.24           Tissue pathology and/or culture taken:  [] Yes [x] No   Sharp debridement performed:   [] Yes [x] No   Labs ordered this visit:   [] Yes [x] No   Imaging ordered this visit:   [] Yes [x] No           Orders Placed This Encounter   Procedures    Change dressing     Left ischial pressure ulcer:  Cleanse wound with: normal saline  Lidocaine:prn  Silver nitrate:prn  Periwound care: maintain dry  Primary dressing: Nushield graft #5,    Secondary dressing: gauze, then mepilex border  Offloading: Roho cushion  Frequency: weekly, ok for patient to change Promogran ( at home) and secondary dressing as needed, graft to be left in place  Follow-up: 1 week with Dr Stone  Other Orders:  Plan for Nushield graft #6 next week        Follow up in about 1 week (around 8/2/2024) for Dr Stone.            This includes face to face time and non-face to face time preparing to see the patient (eg, review of tests), obtaining and/or reviewing separately obtained history, documenting clinical information in the electronic or other health record, independently interpreting results and communicating results to the patient/family/caregiver, or care coordinator.

## 2024-08-02 ENCOUNTER — HOSPITAL ENCOUNTER (OUTPATIENT)
Dept: WOUND CARE | Facility: HOSPITAL | Age: 64
Discharge: HOME | End: 2024-08-02
Attending: PREVENTIVE MEDICINE
Payer: MEDICARE

## 2024-08-02 VITALS
HEART RATE: 96 BPM | RESPIRATION RATE: 18 BRPM | BODY MASS INDEX: 23.48 KG/M2 | DIASTOLIC BLOOD PRESSURE: 64 MMHG | SYSTOLIC BLOOD PRESSURE: 104 MMHG | HEIGHT: 70 IN | TEMPERATURE: 98 F | WEIGHT: 164 LBS

## 2024-08-02 DIAGNOSIS — G82.20 PARAPLEGIA: ICD-10-CM

## 2024-08-02 DIAGNOSIS — L89.324 PRESSURE INJURY OF LEFT ISCHIUM, STAGE 4: Primary | ICD-10-CM

## 2024-08-02 PROCEDURE — 15271 SKIN SUB GRAFT TRNK/ARM/LEG: CPT

## 2024-08-02 NOTE — PROGRESS NOTES
Wound Care & Hyperbaric Medicine    Subjective:       Patient ID: Derik Fonseca is a 64 y.o. male.    Chief Complaint: Pressure Ulcer    Wound care follow up for left ischial pressure ulcer. Nushield 1.6 cm disc application #6 placed to wound(ID#[id number], expiration 04/25/2029, donor ID: [id number]), no saline used. Plan for patient to return in 2 weeks for next application.   Review of Systems      Objective:     Vitals:    08/02/24 1309   BP: 104/64   Pulse: 96   Resp: 18   Temp: 98 °F (36.7 °C)         Physical Exam       Wound 05/17/24 1409 Pressure Injury Left Ischial tuberosity (Active)   05/17/24 1409   Present on Original Admission: Y   Primary Wound Type: Pressure inj   Side: Left   Orientation:    Location: Ischial tuberosity   Wound Approximate Age at First Assessment (Weeks):    Wound Number:    Is this injury device related?:    Incision Type:    Closure Method:    Wound Description (Comments):    Type:    Additional Comments:    Ankle-Brachial Index:    Pulses:    Removal Indication and Assessment:    Wound Outcome:    Wound Image   08/02/24 1317   Pressure Injury Stage 4 08/02/24 1317   Dressing Appearance Moist drainage 08/02/24 1317   Drainage Amount Small 08/02/24 1317   Drainage Characteristics/Odor Serosanguineous 08/02/24 1317   Tissue loss description Full thickness 08/02/24 1317   Periwound Area Intact;Moist 08/02/24 1317   Wound Edges Undefined 08/02/24 1317   Wound Length (cm) 0.5 cm 08/02/24 1317   Wound Width (cm) 0.2 cm 08/02/24 1317   Wound Depth (cm) 1.2 cm 08/02/24 1317   Wound Volume (cm^3) 0.12 cm^3 08/02/24 1317   Wound Surface Area (cm^2) 0.1 cm^2 08/02/24 1317   Undermining (depth (cm)/location) 12-5:00 = 1.5cm 08/02/24 1317   Care Cleansed with:;Sterile normal saline 08/02/24 1317   Dressing Applied;Other (comment);Gauze;Island/border 08/02/24 1317   Off Loading Other (see comments) 08/02/24 1317   Dressing Change Due 08/09/24 08/02/24 1319          Assessment/Plan:         ICD-10-CM ICD-9-CM   1. Pressure injury of left ischium, stage 4  L89.324 707.05     707.24   2. Paraplegia  G82.20 344.1           Tissue pathology and/or culture taken:  [] Yes [x] No   Sharp debridement performed:   [] Yes [x] No   Labs ordered this visit:   [] Yes [x] No   Imaging ordered this visit:   [] Yes [x] No           Orders Placed This Encounter   Procedures    Change dressing     Left ischial pressure ulcer:   Cleanse wound with: normal saline   Lidocaine:prn   Silver nitrate:prn   Periwound care: maintain dry   Primary dressing: Nushield graft #6  Secondary dressing: gauze, then mepilex border   Offloading: Roho cushion   Frequency: weekly, ok for patient to change Promogran ( at home) and secondary dressing as needed, graft to be left in place   Follow-up: 2 weeks with Dr Stone   Other Orders:  Plan for Nushield graft #7 in 2 weeks. End application period is 8/23/24        Follow up in about 2 weeks (around 8/16/2024) for .            This includes face to face time and non-face to face time preparing to see the patient (eg, review of tests), obtaining and/or reviewing separately obtained history, documenting clinical information in the electronic or other health record, independently interpreting results and communicating results to the patient/family/caregiver, or care coordinator.

## 2024-08-16 ENCOUNTER — HOSPITAL ENCOUNTER (OUTPATIENT)
Dept: WOUND CARE | Facility: HOSPITAL | Age: 64
Discharge: HOME | End: 2024-08-16
Attending: PREVENTIVE MEDICINE
Payer: MEDICARE

## 2024-08-16 VITALS
HEART RATE: 80 BPM | WEIGHT: 164 LBS | HEIGHT: 70 IN | SYSTOLIC BLOOD PRESSURE: 97 MMHG | DIASTOLIC BLOOD PRESSURE: 52 MMHG | BODY MASS INDEX: 23.48 KG/M2 | TEMPERATURE: 98 F

## 2024-08-16 DIAGNOSIS — L89.324 PRESSURE INJURY OF LEFT ISCHIUM, STAGE 4: Primary | ICD-10-CM

## 2024-08-16 DIAGNOSIS — G82.20 PARAPLEGIA: ICD-10-CM

## 2024-08-16 PROCEDURE — 15271 SKIN SUB GRAFT TRNK/ARM/LEG: CPT

## 2024-08-16 NOTE — PROGRESS NOTES
Wound Care & Hyperbaric Medicine    Subjective:       Patient ID: Derik Fonseca is a 64 y.o. male.    Chief Complaint: Wound Care and Wound Check    8/16/2024  Follow up related to left ischial pressure ulcer. Excoriation present to periwound, patient's caregiver states related to the adhesive on the bandages they were sent. [facility] 1.6cm disc graft #7 placed, NO-1160c, Donor ID [id number], ID# [id number], [facility] [place] [id number], exp 04/25/2029. Hydrated with NS lot [id number], (01) [id number], exp 05/01/2026. Plan of care updated. Supplies requested for at home dressing change.      Review of Systems      Objective:     Vitals:    08/16/24 1418   BP: (!) 97/52   Pulse: 80   Temp: 97.9 °F (36.6 °C)         Physical Exam       Wound 05/17/24 1409 Pressure Injury Left Ischial tuberosity (Active)   05/17/24 1409   Present on Original Admission: Y   Primary Wound Type: Pressure inj   Side: Left   Orientation:    Location: Ischial tuberosity   Wound Approximate Age at First Assessment (Weeks):    Wound Number:    Is this injury device related?:    Incision Type:    Closure Method:    Wound Description (Comments):    Type:    Additional Comments:    Ankle-Brachial Index:    Pulses:    Removal Indication and Assessment:    Wound Outcome:    Wound Image   08/16/24 1400   Pressure Injury Stage 4 08/16/24 1400   Dressing Appearance Moist drainage 08/16/24 1400   Drainage Amount Moderate 08/16/24 1400   Drainage Characteristics/Odor Serosanguineous 08/16/24 1400   Tissue loss description Full thickness 08/16/24 1400   Periwound Area Pale white;Excoriated 08/16/24 1400   Wound Edges Undefined 08/16/24 1400   Wound Length (cm) 0.7 cm 08/16/24 1400   Wound Width (cm) 0.4 cm 08/16/24 1400   Wound Depth (cm) 1.3 cm 08/16/24 1400   Wound Volume (cm^3) 0.364 cm^3 08/16/24 1400   Wound Surface Area (cm^2) 0.28 cm^2 08/16/24 1400   Undermining (depth (cm)/location) 1.6cm@12-6oclock 08/16/24 1400   Care Cleansed  with:;Sterile normal saline 08/16/24 1400   Dressing Applied;Other (comment);Gauze;Island/border 08/16/24 1400   Packing packed with 08/16/24 1400   Packing Inserted  2 08/16/24 1400   Packing Removed 0 08/16/24 1400   Periwound Care Absorptive dressing applied;Dry periwound area maintained 08/16/24 1400   Off Loading Other (see comments) 08/16/24 1400   Dressing Change Due 08/18/24 08/16/24 1400         Assessment/Plan:         ICD-10-CM ICD-9-CM   1. Pressure injury of left ischium, stage 4  L89.324 707.05     707.24           Tissue pathology and/or culture taken:  [] Yes [x] No   Sharp debridement performed:   [] Yes [x] No   Labs ordered this visit:   [] Yes [x] No   Imaging ordered this visit:   [] Yes [x] No           Orders Placed This Encounter   Procedures    Change dressing     Left ischial pressure ulcer:  Cleanse wound with: normal saline  Lidocaine:prn  Silver nitrate:prn  Periwound care: maintain dry  Primary dressing: ([facility] graft #7 then endoform on 8/16/24), from 8/17/24-8/23/24 patient to change endoform and secondary dressing, graft to be left in place; starting 8/24/24 stop with the endoform and pack with Gisele  Secondary dressing: mepilex border 3x3  Offloading: Roho cushion  Frequency: every other day, ok for patient to change endoform and secondary dressing as needed, graft to be left in place  Follow-up: 3 weeks with Dr Stone  Other Orders:  Supplies requested for at home dressing change, please dispense per MD order, regular adhesive tears his skin        Follow up in about 3 weeks (around 9/6/2024) for Dr Stone.            This includes face to face time and non-face to face time preparing to see the patient (eg, review of tests), obtaining and/or reviewing separately obtained history, documenting clinical information in the electronic or other health record, independently interpreting results and communicating results to the patient/family/caregiver, or care coordinator.               This includes face to face time and non-face to face time preparing to see the patient (eg, review of tests), obtaining and/or reviewing separately obtained history, documenting clinical information in the electronic or other health record, independently interpreting results and communicating results to the patient/family/caregiver, or care coordinator.

## 2024-09-06 ENCOUNTER — HOSPITAL ENCOUNTER (OUTPATIENT)
Dept: WOUND CARE | Facility: HOSPITAL | Age: 64
Discharge: HOME | End: 2024-09-06
Attending: PREVENTIVE MEDICINE
Payer: MEDICARE

## 2024-09-06 VITALS
TEMPERATURE: 98 F | WEIGHT: 164 LBS | HEIGHT: 70 IN | HEART RATE: 137 BPM | SYSTOLIC BLOOD PRESSURE: 115 MMHG | BODY MASS INDEX: 23.48 KG/M2 | DIASTOLIC BLOOD PRESSURE: 71 MMHG

## 2024-09-06 DIAGNOSIS — L89.324 PRESSURE INJURY OF LEFT ISCHIUM, STAGE 4: Primary | ICD-10-CM

## 2024-09-06 DIAGNOSIS — G82.20 PARAPLEGIA: ICD-10-CM

## 2024-09-06 PROCEDURE — 99204 OFFICE O/P NEW MOD 45 MIN: CPT

## 2024-09-06 NOTE — PROGRESS NOTES
Wound Care & Hyperbaric Medicine    Subjective:       Patient ID: Derik Fonseca is a 64 y.o. male.    Chief Complaint: Non-healing Wound Follow Up and Pressure Ulcer    Follow up on left ischial pressure injury.  Some improvement appreciated. Will continue current plan of care with Gisele packing.  Review of Systems      Objective:     Vitals:    09/06/24 1420   BP: 115/71   Pulse: (!) 137   Temp: 97.8 °F (36.6 °C)         Physical Exam       Wound 05/17/24 1409 Pressure Injury Left Ischial tuberosity (Active)   05/17/24 1409   Present on Original Admission: Y   Primary Wound Type: Pressure inj   Side: Left   Orientation:    Location: Ischial tuberosity   Wound Approximate Age at First Assessment (Weeks):    Wound Number:    Is this injury device related?:    Incision Type:    Closure Method:    Wound Description (Comments):    Type:    Additional Comments:    Ankle-Brachial Index:    Pulses:    Removal Indication and Assessment:    Wound Outcome:    Pressure Injury Stage 4 09/06/24 1432   Dressing Appearance Moist drainage 09/06/24 1432   Drainage Amount Small 09/06/24 1432   Drainage Characteristics/Odor Serosanguineous 09/06/24 1432   Appearance Red 09/06/24 1432   Tissue loss description Full thickness 09/06/24 1432   Red (%), Wound Tissue Color 100 % 09/06/24 1432   Periwound Area Intact;Dry;Scar tissue 09/06/24 1432   Wound Edges Defined;Open 09/06/24 1432   Wound Length (cm) 0.4 cm 09/06/24 1432   Wound Width (cm) 0.2 cm 09/06/24 1432   Wound Depth (cm) 1.5 cm 09/06/24 1432   Wound Volume (cm^3) 0.12 cm^3 09/06/24 1432   Wound Surface Area (cm^2) 0.08 cm^2 09/06/24 1432   Tunneling (depth (cm)/location) 1.6@3 09/06/24 1432   Care Cleansed with:;Sterile normal saline 09/06/24 1432   Dressing Foam 09/06/24 1432   Packing packed with;other (see comment) 09/06/24 1432   Packing Inserted  1 09/06/24 1432   Dressing Change Due 09/09/24 09/06/24 1432         Assessment/Plan:         ICD-10-CM  ICD-9-CM   1. Pressure injury of left ischium, stage 4  L89.324 707.05     707.24   2. Paraplegia  G82.20 344.1           Tissue pathology and/or culture taken:  [] Yes [x] No   Sharp debridement performed:   [] Yes [x] No   Labs ordered this visit:   [] Yes [x] No   Imaging ordered this visit:   [] Yes [x] No           Orders Placed This Encounter   Procedures    Change dressing     Left ischial pressure ulcer:   Cleanse wound with: normal saline   Lidocaine:prn   Silver nitrate:prn   Periwound care: maintain dry   Primary dressing: pack with moistened Gisele   Secondary dressing: mepilex border 3x3   Offloading: Roho cushion   Frequency: every other day, ok for patient to change endoform and secondary dressing as needed, graft to be left in place   Follow-up: 4 weeks with Dr Stone        Follow up in about 4 weeks (around 10/4/2024) for Dr Stone.            This includes face to face time and non-face to face time preparing to see the patient (eg, review of tests), obtaining and/or reviewing separately obtained history, documenting clinical information in the electronic or other health record, independently interpreting results and communicating results to the patient/family/caregiver, or care coordinator.

## 2024-10-23 ENCOUNTER — PATIENT MESSAGE (OUTPATIENT)
Dept: RESEARCH | Facility: HOSPITAL | Age: 64
End: 2024-10-23
Payer: MEDICARE

## 2024-11-25 ENCOUNTER — HOSPITAL ENCOUNTER (OUTPATIENT)
Dept: WOUND CARE | Facility: HOSPITAL | Age: 64
Discharge: HOME | End: 2024-11-25
Attending: PREVENTIVE MEDICINE
Payer: MEDICARE

## 2024-11-25 VITALS — DIASTOLIC BLOOD PRESSURE: 67 MMHG | HEART RATE: 77 BPM | RESPIRATION RATE: 18 BRPM | SYSTOLIC BLOOD PRESSURE: 100 MMHG

## 2024-11-25 DIAGNOSIS — L89.324 PRESSURE INJURY OF LEFT ISCHIUM, STAGE 4: Primary | ICD-10-CM

## 2024-11-25 DIAGNOSIS — G82.20 PARAPLEGIA: ICD-10-CM

## 2024-11-25 PROCEDURE — 99213 OFFICE O/P EST LOW 20 MIN: CPT

## 2024-11-25 NOTE — PROGRESS NOTES
Wound Care & Hyperbaric Medicine    Subjective:       Patient ID: Derik Fonseca is a 64 y.o. male.    Chief Complaint: Non-healing Wound    Readmit to wound care clinic for left ischial pressure ulcer. Patient was seen in clinic 9/2024. Wound bed clean, no signs of infection, undermining has improved. Patient and caregiver request to manage wound on their own- will call if any issues for a follow up appointment. Continue Gisele packing. Return to clinic prn.    Review of Systems   All other systems reviewed and are negative.        Objective:     Vitals:    11/25/24 1305   BP: 100/67   Pulse: 77   Resp: 18         Physical Exam       Wound 05/17/24 1409 Pressure Injury Left Ischial tuberosity (Active)   05/17/24 1409 Ischial tuberosity   Present on Original Admission: Y   Primary Wound Type: Pressure inj   Side: Left   Orientation:    Wound Approximate Age at First Assessment (Weeks):    Wound Number:    Is this injury device related?:    Incision Type:    Closure Method:    Wound Description (Comments):    Type:    Additional Comments:    Ankle-Brachial Index:    Pulses:    Removal Indication and Assessment:    Wound Outcome:    Wound Image   11/25/24 1324   Pressure Injury Stage 4 11/25/24 1324   Dressing Appearance Moist drainage 11/25/24 1324   Drainage Amount Moderate 11/25/24 1324   Drainage Characteristics/Odor Serous 11/25/24 1324   Appearance Red 11/25/24 1324   Tissue loss description Full thickness 11/25/24 1324   Red (%), Wound Tissue Color 100 % 11/25/24 1324   Periwound Area Macerated 11/25/24 1324   Wound Edges Defined 11/25/24 1324   Wound Length (cm) 0.4 cm 11/25/24 1324   Wound Width (cm) 0.2 cm 11/25/24 1324   Wound Depth (cm) 1 cm 11/25/24 1324   Wound Volume (cm^3) 0.08 cm^3 11/25/24 1324   Wound Surface Area (cm^2) 0.08 cm^2 11/25/24 1324   Undermining (depth (cm)/location) 12-12:00 = 1.0cm 11/25/24 1324   Care Cleansed with:;Antimicrobial agent 11/25/24 1324   Dressing  Applied;Silver;Collagen;Island/border 11/25/24 1324   Dressing Change Due 11/27/24 11/25/24 1324         Assessment/Plan:         ICD-10-CM ICD-9-CM   1. Pressure injury of left ischium, stage 4  L89.324 707.05     707.24   2. Paraplegia  G82.20 344.1       Wound is improving. No signs of infection or necrosis.      Tissue pathology and/or culture taken:  [] Yes [x] No   Sharp debridement performed:   [] Yes [x] No   Labs ordered this visit:   [] Yes [x] No   Imaging ordered this visit:   [] Yes [x] No           Orders Placed This Encounter   Procedures    Change dressing     Left ischial pressure ulcer:  Cleanse wound with: normal saline  Lidocaine:prn  Silver nitrate:prn  Periwound care: maintain dry  Primary dressing: pack with moistened Gisele  Secondary dressing: mepilex border 3x3  Offloading: Roho cushion  Frequency: 3 times a week  Follow-up: Dr Stone PREARLE        Follow up if symptoms worsen or fail to improve.            This includes face to face time and non-face to face time preparing to see the patient (eg, review of tests), obtaining and/or reviewing separately obtained history, documenting clinical information in the electronic or other health record, independently interpreting results and communicating results to the patient/family/caregiver, or care coordinator.

## 2025-06-12 ENCOUNTER — HOSPITAL ENCOUNTER (OUTPATIENT)
Dept: WOUND CARE | Facility: HOSPITAL | Age: 65
Discharge: HOME | End: 2025-06-12
Attending: PREVENTIVE MEDICINE
Payer: MEDICARE

## 2025-06-12 DIAGNOSIS — L02.412 ABSCESS OF LEFT AXILLA: ICD-10-CM

## 2025-06-12 DIAGNOSIS — L89.324 PRESSURE INJURY OF LEFT ISCHIUM, STAGE 4: Primary | ICD-10-CM

## 2025-06-12 PROCEDURE — 99214 OFFICE O/P EST MOD 30 MIN: CPT

## 2025-06-12 RX ORDER — DOXYCYCLINE HYCLATE 100 MG
100 TABLET ORAL EVERY 12 HOURS
Qty: 20 TABLET | Refills: 0 | Status: SHIPPED | OUTPATIENT
Start: 2025-06-12 | End: 2025-06-22

## 2025-06-12 NOTE — PROGRESS NOTES
Wound Care & Hyperbaric Medicine    Subjective:       Patient ID: Derik Fonseca is a 64 y.o. male.    Chief Complaint: Pressure Ulcer    Wound care follow up for left ischial pressure ulcer. Patient reports using Gisele. Clean wound bed - area stable. Patient also states he has abscess to  left axilla for 1 week. Reports taking dose of Doxycycline for 7 days. Slight redness noted to periwound, no fluctuance, 2 cm induration.Will defer I&D at this time. Will continue doxycyline and continue to monitor   - patient to contact clinic for worsening.        Objective:      Physical Exam       Wound 05/17/24 1409 Pressure Injury Left Ischial tuberosity (Active)   05/17/24 1409 Ischial tuberosity   Present on Original Admission: Y   Primary Wound Type: Pressure inj   Side: Left   Orientation:    Wound Approximate Age at First Assessment (Weeks):    Wound Number:    Is this injury device related?:    Incision Type:    Closure Method:    Wound Description (Comments):    Type:    Additional Comments:    Ankle-Brachial Index:    Pulses:    Removal Indication and Assessment:    Wound Outcome:    Wound Image   06/12/25 1313   Pressure Injury Stage 4 06/12/25 1313   Dressing Appearance Moist drainage 06/12/25 1313   Drainage Amount Moderate 06/12/25 1313   Drainage Characteristics/Odor Serosanguineous 06/12/25 1313   Appearance Red 06/12/25 1313   Tissue loss description Full thickness 06/12/25 1313   Red (%), Wound Tissue Color 100 % 06/12/25 1313   Periwound Area Redness 06/12/25 1313   Wound Edges Defined 06/12/25 1313   Wound Length (cm) 0.8 cm 06/12/25 1313   Wound Width (cm) 0.3 cm 06/12/25 1313   Wound Depth (cm) 0.2 cm 06/12/25 1313   Wound Volume (cm^3) 0.025 cm^3 06/12/25 1313   Wound Surface Area (cm^2) 0.19 cm^2 06/12/25 1313   Undermining (depth (cm)/location) 12-12:00 = 1.0cm 06/12/25 1313   Care Cleansed with:;Antimicrobial agent 06/12/25 1313   Dressing Applied;Silver;Collagen;Island/border  06/12/25 1313   Off Loading Other (see comments) 06/12/25 1313   Dressing Change Due 06/14/25 06/12/25 1313            Wound 06/12/25 1313 Abscess Left Axilla (Active)   06/12/25 1313 Axilla   Present on Original Admission: Y   Primary Wound Type: Abscess   Side: Left   Orientation:    Wound Approximate Age at First Assessment (Weeks):    Wound Number:    Is this injury device related?:    Incision Type:    Closure Method:    Wound Description (Comments):    Type:    Additional Comments:    Ankle-Brachial Index:    Pulses:    Removal Indication and Assessment:    Wound Outcome:    Wound Image   06/12/25 1313   Dressing Appearance Open to air 06/12/25 1313   Drainage Amount None 06/12/25 1313   Periwound Area Indurated;Redness 06/12/25 1313         Assessment/Plan:         ICD-10-CM ICD-9-CM   1. Pressure injury of left ischium, stage 4  L89.324 707.05     707.24   2. Abscess of left axilla  L02.412 682.3         Tissue pathology and/or culture taken:   [] Yes    [x] No   Sharp debridement performed:    [] Yes    [x] No   Labs ordered this visit:    [] Yes    [x] No   Imaging ordered this visit:    [] Yes    [x] No     Is the wound improving?    [] Yes    [x] No   Any signs of infection?    [] Yes    [x] No             Orders Placed This Encounter   Procedures    Change dressing     Left ischial pressure ulcer:   Cleanse wound with: Vashe gel  Lidocaine:prn   Silver nitrate:prn   Periwound care: maintain dry   Primary dressing: pack with moistened Gisele   Secondary dressing: mepilex border 3x3   Offloading: Roho cushion   Frequency: 3 times a week and prn  Follow-up: Dr Stone PRN   Other: doxycyline for left axilla abscess        Follow up if symptoms worsen or fail to improve.            This includes face to face time and non-face to face time preparing to see the patient (eg, review of tests), obtaining and/or reviewing separately obtained history, documenting clinical information in the electronic or other  health record, independently interpreting results and communicating results to the patient/family/caregiver, or care coordinator.  Total time spent  >40  minutes